# Patient Record
Sex: MALE | Race: WHITE | NOT HISPANIC OR LATINO
[De-identification: names, ages, dates, MRNs, and addresses within clinical notes are randomized per-mention and may not be internally consistent; named-entity substitution may affect disease eponyms.]

---

## 2017-01-03 ENCOUNTER — RESULT REVIEW (OUTPATIENT)
Age: 77
End: 2017-01-03

## 2017-01-04 ENCOUNTER — LABORATORY RESULT (OUTPATIENT)
Age: 77
End: 2017-01-04

## 2017-01-04 ENCOUNTER — CHART COPY (OUTPATIENT)
Age: 77
End: 2017-01-04

## 2017-01-04 ENCOUNTER — APPOINTMENT (OUTPATIENT)
Dept: INFUSION THERAPY | Facility: HOSPITAL | Age: 77
End: 2017-01-04

## 2017-01-05 ENCOUNTER — APPOINTMENT (OUTPATIENT)
Dept: INFUSION THERAPY | Facility: HOSPITAL | Age: 77
End: 2017-01-05

## 2017-01-05 ENCOUNTER — FORM ENCOUNTER (OUTPATIENT)
Age: 77
End: 2017-01-05

## 2017-01-06 ENCOUNTER — APPOINTMENT (OUTPATIENT)
Dept: INFUSION THERAPY | Facility: HOSPITAL | Age: 77
End: 2017-01-06

## 2017-01-06 ENCOUNTER — LABORATORY RESULT (OUTPATIENT)
Age: 77
End: 2017-01-06

## 2017-01-06 ENCOUNTER — OUTPATIENT (OUTPATIENT)
Dept: OUTPATIENT SERVICES | Facility: HOSPITAL | Age: 77
LOS: 1 days | End: 2017-01-06
Payer: MEDICARE

## 2017-01-06 ENCOUNTER — APPOINTMENT (OUTPATIENT)
Dept: ULTRASOUND IMAGING | Facility: IMAGING CENTER | Age: 77
End: 2017-01-06

## 2017-01-06 DIAGNOSIS — Z98.89 OTHER SPECIFIED POSTPROCEDURAL STATES: Chronic | ICD-10-CM

## 2017-01-06 DIAGNOSIS — C83.30 DIFFUSE LARGE B-CELL LYMPHOMA, UNSPECIFIED SITE: ICD-10-CM

## 2017-01-06 DIAGNOSIS — Z98.1 ARTHRODESIS STATUS: Chronic | ICD-10-CM

## 2017-01-06 PROCEDURE — 93970 EXTREMITY STUDY: CPT

## 2017-01-07 ENCOUNTER — APPOINTMENT (OUTPATIENT)
Dept: INFUSION THERAPY | Facility: HOSPITAL | Age: 77
End: 2017-01-07

## 2017-01-09 ENCOUNTER — FORM ENCOUNTER (OUTPATIENT)
Age: 77
End: 2017-01-09

## 2017-01-09 ENCOUNTER — RESULT REVIEW (OUTPATIENT)
Age: 77
End: 2017-01-09

## 2017-01-09 ENCOUNTER — OUTPATIENT (OUTPATIENT)
Dept: OUTPATIENT SERVICES | Facility: HOSPITAL | Age: 77
LOS: 1 days | Discharge: HOME | End: 2017-01-09
Payer: MEDICARE

## 2017-01-09 DIAGNOSIS — Z98.89 OTHER SPECIFIED POSTPROCEDURAL STATES: Chronic | ICD-10-CM

## 2017-01-09 DIAGNOSIS — C85.88 OTHER SPECIFIED TYPES OF NON-HODGKIN LYMPHOMA, LYMPH NODES OF MULTIPLE SITES: ICD-10-CM

## 2017-01-09 DIAGNOSIS — Z98.1 ARTHRODESIS STATUS: Chronic | ICD-10-CM

## 2017-01-10 ENCOUNTER — APPOINTMENT (OUTPATIENT)
Dept: HEMATOLOGY ONCOLOGY | Facility: CLINIC | Age: 77
End: 2017-01-10

## 2017-01-10 ENCOUNTER — APPOINTMENT (OUTPATIENT)
Dept: CT IMAGING | Facility: IMAGING CENTER | Age: 77
End: 2017-01-10

## 2017-01-10 ENCOUNTER — APPOINTMENT (OUTPATIENT)
Dept: CARDIOLOGY | Facility: CLINIC | Age: 77
End: 2017-01-10

## 2017-01-10 ENCOUNTER — OUTPATIENT (OUTPATIENT)
Dept: OUTPATIENT SERVICES | Facility: HOSPITAL | Age: 77
LOS: 1 days | End: 2017-01-10
Payer: MEDICARE

## 2017-01-10 ENCOUNTER — NON-APPOINTMENT (OUTPATIENT)
Age: 77
End: 2017-01-10

## 2017-01-10 VITALS
TEMPERATURE: 99.2 F | RESPIRATION RATE: 17 BRPM | SYSTOLIC BLOOD PRESSURE: 130 MMHG | WEIGHT: 203.46 LBS | HEART RATE: 124 BPM | OXYGEN SATURATION: 99 % | DIASTOLIC BLOOD PRESSURE: 74 MMHG | BODY MASS INDEX: 26.39 KG/M2

## 2017-01-10 VITALS
BODY MASS INDEX: 26.33 KG/M2 | OXYGEN SATURATION: 94 % | SYSTOLIC BLOOD PRESSURE: 98 MMHG | HEIGHT: 73.62 IN | DIASTOLIC BLOOD PRESSURE: 62 MMHG | HEART RATE: 111 BPM | WEIGHT: 203 LBS

## 2017-01-10 VITALS — DIASTOLIC BLOOD PRESSURE: 65 MMHG | SYSTOLIC BLOOD PRESSURE: 105 MMHG

## 2017-01-10 DIAGNOSIS — Z98.89 OTHER SPECIFIED POSTPROCEDURAL STATES: Chronic | ICD-10-CM

## 2017-01-10 DIAGNOSIS — C83.30 DIFFUSE LARGE B-CELL LYMPHOMA, UNSPECIFIED SITE: ICD-10-CM

## 2017-01-10 DIAGNOSIS — R07.89 OTHER CHEST PAIN: ICD-10-CM

## 2017-01-10 DIAGNOSIS — Z98.1 ARTHRODESIS STATUS: Chronic | ICD-10-CM

## 2017-01-10 DIAGNOSIS — H35.30 UNSPECIFIED MACULAR DEGENERATION: ICD-10-CM

## 2017-01-10 LAB
BASOPHILS # BLD AUTO: 0.1 K/UL — SIGNIFICANT CHANGE UP (ref 0–0.2)
BASOPHILS NFR BLD AUTO: 0.3 % — SIGNIFICANT CHANGE UP (ref 0–2)
EOSINOPHIL # BLD AUTO: 0.2 K/UL — SIGNIFICANT CHANGE UP (ref 0–0.5)
EOSINOPHIL NFR BLD AUTO: 1.5 % — SIGNIFICANT CHANGE UP (ref 0–6)
HCT VFR BLD CALC: 35 % — LOW (ref 39–50)
HGB BLD-MCNC: 11.5 G/DL — LOW (ref 13–17)
LYMPHOCYTES # BLD AUTO: 0.7 K/UL — LOW (ref 1–3.3)
LYMPHOCYTES # BLD AUTO: 4.3 % — LOW (ref 13–44)
MCHC RBC-ENTMCNC: 30.2 PG — SIGNIFICANT CHANGE UP (ref 27–34)
MCHC RBC-ENTMCNC: 32.8 GM/DL — SIGNIFICANT CHANGE UP (ref 32–36)
MCV RBC AUTO: 92.3 FL — SIGNIFICANT CHANGE UP (ref 80–100)
MONOCYTES # BLD AUTO: 0.1 K/UL — SIGNIFICANT CHANGE UP (ref 0–0.9)
MONOCYTES NFR BLD AUTO: 0.7 % — LOW (ref 2–14)
NEUTROPHILS # BLD AUTO: 15 K/UL — HIGH (ref 1.8–7.4)
NEUTROPHILS NFR BLD AUTO: 93.2 % — HIGH (ref 43–77)
PLATELET # BLD AUTO: 147 K/UL — LOW (ref 150–400)
RBC # BLD: 3.8 M/UL — LOW (ref 4.2–5.8)
RBC # FLD: 12.6 % — SIGNIFICANT CHANGE UP (ref 10.3–14.5)
WBC # BLD: 16.1 K/UL — HIGH (ref 3.8–10.5)
WBC # FLD AUTO: 16.1 K/UL — HIGH (ref 3.8–10.5)

## 2017-01-10 PROCEDURE — 70450 CT HEAD/BRAIN W/O DYE: CPT

## 2017-01-10 PROCEDURE — 93010 ELECTROCARDIOGRAM REPORT: CPT

## 2017-01-24 ENCOUNTER — RESULT REVIEW (OUTPATIENT)
Age: 77
End: 2017-01-24

## 2017-01-25 ENCOUNTER — APPOINTMENT (OUTPATIENT)
Dept: INFUSION THERAPY | Facility: HOSPITAL | Age: 77
End: 2017-01-25

## 2017-01-25 ENCOUNTER — LABORATORY RESULT (OUTPATIENT)
Age: 77
End: 2017-01-25

## 2017-01-25 LAB
BASOPHILS # BLD AUTO: 0 K/UL — SIGNIFICANT CHANGE UP (ref 0–0.2)
BASOPHILS NFR BLD AUTO: 0.7 % — SIGNIFICANT CHANGE UP (ref 0–2)
EOSINOPHIL # BLD AUTO: 0.2 K/UL — SIGNIFICANT CHANGE UP (ref 0–0.5)
EOSINOPHIL NFR BLD AUTO: 4 % — SIGNIFICANT CHANGE UP (ref 0–6)
HCT VFR BLD CALC: 36.3 % — LOW (ref 39–50)
HGB BLD-MCNC: 12 G/DL — LOW (ref 13–17)
LYMPHOCYTES # BLD AUTO: 0.9 K/UL — LOW (ref 1–3.3)
LYMPHOCYTES # BLD AUTO: 17.8 % — SIGNIFICANT CHANGE UP (ref 13–44)
MCHC RBC-ENTMCNC: 30.4 PG — SIGNIFICANT CHANGE UP (ref 27–34)
MCHC RBC-ENTMCNC: 33.1 GM/DL — SIGNIFICANT CHANGE UP (ref 32–36)
MCV RBC AUTO: 91.9 FL — SIGNIFICANT CHANGE UP (ref 80–100)
MONOCYTES # BLD AUTO: 0.9 K/UL — SIGNIFICANT CHANGE UP (ref 0–0.9)
MONOCYTES NFR BLD AUTO: 16.8 % — HIGH (ref 2–14)
NEUTROPHILS # BLD AUTO: 3.1 K/UL — SIGNIFICANT CHANGE UP (ref 1.8–7.4)
NEUTROPHILS NFR BLD AUTO: 60.6 % — SIGNIFICANT CHANGE UP (ref 43–77)
PLATELET # BLD AUTO: 238 K/UL — SIGNIFICANT CHANGE UP (ref 150–400)
RBC # BLD: 3.95 M/UL — LOW (ref 4.2–5.8)
RBC # FLD: 13.4 % — SIGNIFICANT CHANGE UP (ref 10.3–14.5)
WBC # BLD: 5.1 K/UL — SIGNIFICANT CHANGE UP (ref 3.8–10.5)
WBC # FLD AUTO: 5.1 K/UL — SIGNIFICANT CHANGE UP (ref 3.8–10.5)

## 2017-01-26 ENCOUNTER — APPOINTMENT (OUTPATIENT)
Dept: INFUSION THERAPY | Facility: HOSPITAL | Age: 77
End: 2017-01-26

## 2017-01-26 DIAGNOSIS — R11.2 NAUSEA WITH VOMITING, UNSPECIFIED: ICD-10-CM

## 2017-01-26 DIAGNOSIS — Z51.11 ENCOUNTER FOR ANTINEOPLASTIC CHEMOTHERAPY: ICD-10-CM

## 2017-01-27 ENCOUNTER — LABORATORY RESULT (OUTPATIENT)
Age: 77
End: 2017-01-27

## 2017-01-27 ENCOUNTER — APPOINTMENT (OUTPATIENT)
Dept: INFUSION THERAPY | Facility: HOSPITAL | Age: 77
End: 2017-01-27

## 2017-01-27 DIAGNOSIS — E86.0 DEHYDRATION: ICD-10-CM

## 2017-01-28 ENCOUNTER — APPOINTMENT (OUTPATIENT)
Dept: INFUSION THERAPY | Facility: HOSPITAL | Age: 77
End: 2017-01-28

## 2017-01-30 ENCOUNTER — RESULT REVIEW (OUTPATIENT)
Age: 77
End: 2017-01-30

## 2017-01-30 DIAGNOSIS — Z51.89 ENCOUNTER FOR OTHER SPECIFIED AFTERCARE: ICD-10-CM

## 2017-01-31 ENCOUNTER — APPOINTMENT (OUTPATIENT)
Dept: HEMATOLOGY ONCOLOGY | Facility: CLINIC | Age: 77
End: 2017-01-31

## 2017-01-31 VITALS
OXYGEN SATURATION: 94 % | TEMPERATURE: 97.8 F | DIASTOLIC BLOOD PRESSURE: 63 MMHG | SYSTOLIC BLOOD PRESSURE: 122 MMHG | HEART RATE: 101 BPM | BODY MASS INDEX: 25.74 KG/M2 | WEIGHT: 198.39 LBS | RESPIRATION RATE: 16 BRPM

## 2017-01-31 LAB
BASOPHILS # BLD AUTO: 0 K/UL — SIGNIFICANT CHANGE UP (ref 0–0.2)
EOSINOPHIL # BLD AUTO: 0.2 K/UL — SIGNIFICANT CHANGE UP (ref 0–0.5)
EOSINOPHIL NFR BLD AUTO: 2 % — SIGNIFICANT CHANGE UP (ref 0–6)
HCT VFR BLD CALC: 33.8 % — LOW (ref 39–50)
HGB BLD-MCNC: 10.7 G/DL — LOW (ref 13–17)
LYMPHOCYTES # BLD AUTO: 0.3 K/UL — LOW (ref 1–3.3)
LYMPHOCYTES # BLD AUTO: 2 % — LOW (ref 13–44)
MCHC RBC-ENTMCNC: 29.9 PG — SIGNIFICANT CHANGE UP (ref 27–34)
MCHC RBC-ENTMCNC: 31.7 GM/DL — LOW (ref 32–36)
MCV RBC AUTO: 94.2 FL — SIGNIFICANT CHANGE UP (ref 80–100)
METAMYELOCYTES # FLD: 9 % — HIGH (ref 0–0)
MONOCYTES # BLD AUTO: 0.2 K/UL — SIGNIFICANT CHANGE UP (ref 0–0.9)
MONOCYTES NFR BLD AUTO: 2 % — SIGNIFICANT CHANGE UP (ref 2–14)
MYELOCYTES NFR BLD: 1 % — HIGH (ref 0–0)
NEUTROPHILS # BLD AUTO: 38.1 K/UL — HIGH (ref 1.8–7.4)
NEUTROPHILS NFR BLD AUTO: 79 % — HIGH (ref 43–77)
NEUTS BAND # BLD: 5 % — SIGNIFICANT CHANGE UP (ref 0–8)
PLAT MORPH BLD: NORMAL — SIGNIFICANT CHANGE UP
PLATELET # BLD AUTO: 182 K/UL — SIGNIFICANT CHANGE UP (ref 150–400)
RBC # BLD: 3.59 M/UL — LOW (ref 4.2–5.8)
RBC # FLD: 13.5 % — SIGNIFICANT CHANGE UP (ref 10.3–14.5)
RBC BLD AUTO: SIGNIFICANT CHANGE UP
WBC # BLD: 38.8 K/UL — HIGH (ref 3.8–10.5)
WBC # FLD AUTO: 38.8 K/UL — HIGH (ref 3.8–10.5)

## 2017-01-31 RX ORDER — AMOXICILLIN AND CLAVULANATE POTASSIUM 875; 125 MG/1; MG/1
875-125 TABLET, COATED ORAL
Qty: 20 | Refills: 0 | Status: DISCONTINUED | COMMUNITY
Start: 2017-01-09 | End: 2017-01-31

## 2017-01-31 RX ORDER — LEVOFLOXACIN 500 MG/1
500 TABLET, FILM COATED ORAL
Qty: 7 | Refills: 4 | Status: DISCONTINUED | COMMUNITY
Start: 2017-01-04 | End: 2017-01-31

## 2017-01-31 RX ORDER — ALLOPURINOL 300 MG/1
300 TABLET ORAL
Qty: 14 | Refills: 0 | Status: DISCONTINUED | COMMUNITY
Start: 2017-01-03 | End: 2017-01-31

## 2017-02-14 ENCOUNTER — RESULT REVIEW (OUTPATIENT)
Age: 77
End: 2017-02-14

## 2017-02-14 ENCOUNTER — OUTPATIENT (OUTPATIENT)
Dept: OUTPATIENT SERVICES | Facility: HOSPITAL | Age: 77
LOS: 1 days | Discharge: HOME | End: 2017-02-14
Payer: MEDICARE

## 2017-02-14 DIAGNOSIS — Z98.1 ARTHRODESIS STATUS: Chronic | ICD-10-CM

## 2017-02-14 DIAGNOSIS — Z98.89 OTHER SPECIFIED POSTPROCEDURAL STATES: Chronic | ICD-10-CM

## 2017-02-14 DIAGNOSIS — C85.88 OTHER SPECIFIED TYPES OF NON-HODGKIN LYMPHOMA, LYMPH NODES OF MULTIPLE SITES: ICD-10-CM

## 2017-02-15 ENCOUNTER — LABORATORY RESULT (OUTPATIENT)
Age: 77
End: 2017-02-15

## 2017-02-15 ENCOUNTER — APPOINTMENT (OUTPATIENT)
Dept: INFUSION THERAPY | Facility: HOSPITAL | Age: 77
End: 2017-02-15

## 2017-02-15 LAB
ANISOCYTOSIS BLD QL: SLIGHT — SIGNIFICANT CHANGE UP
BASOPHILS # BLD AUTO: 0.1 K/UL — SIGNIFICANT CHANGE UP (ref 0–0.2)
BASOPHILS NFR BLD AUTO: 4 % — HIGH (ref 0–2)
ELLIPTOCYTES BLD QL SMEAR: SLIGHT — SIGNIFICANT CHANGE UP
EOSINOPHIL # BLD AUTO: 0 K/UL — SIGNIFICANT CHANGE UP (ref 0–0.5)
EOSINOPHIL NFR BLD AUTO: 5 % — SIGNIFICANT CHANGE UP (ref 0–6)
HCT VFR BLD CALC: 36.4 % — LOW (ref 39–50)
HGB BLD-MCNC: 11.8 G/DL — LOW (ref 13–17)
LYMPHOCYTES # BLD AUTO: 0.9 K/UL — LOW (ref 1–3.3)
LYMPHOCYTES # BLD AUTO: 12 % — LOW (ref 13–44)
MACROCYTES BLD QL: SLIGHT — SIGNIFICANT CHANGE UP
MCHC RBC-ENTMCNC: 30.1 PG — SIGNIFICANT CHANGE UP (ref 27–34)
MCHC RBC-ENTMCNC: 32.4 G/DL — SIGNIFICANT CHANGE UP (ref 32–36)
MCV RBC AUTO: 93 FL — SIGNIFICANT CHANGE UP (ref 80–100)
MONOCYTES # BLD AUTO: 0.6 K/UL — SIGNIFICANT CHANGE UP (ref 0–0.9)
MONOCYTES NFR BLD AUTO: 8 % — SIGNIFICANT CHANGE UP (ref 2–14)
MYELOCYTES NFR BLD: 1 % — HIGH (ref 0–0)
NEUTROPHILS # BLD AUTO: 5.4 K/UL — SIGNIFICANT CHANGE UP (ref 1.8–7.4)
NEUTROPHILS NFR BLD AUTO: 70 % — SIGNIFICANT CHANGE UP (ref 43–77)
OVALOCYTES BLD QL SMEAR: SLIGHT — SIGNIFICANT CHANGE UP
PLAT MORPH BLD: NORMAL — SIGNIFICANT CHANGE UP
PLATELET # BLD AUTO: 207 K/UL — SIGNIFICANT CHANGE UP (ref 150–400)
POIKILOCYTOSIS BLD QL AUTO: SLIGHT — SIGNIFICANT CHANGE UP
POLYCHROMASIA BLD QL SMEAR: SLIGHT — SIGNIFICANT CHANGE UP
RBC # BLD: 3.91 M/UL — LOW (ref 4.2–5.8)
RBC # FLD: 14 % — SIGNIFICANT CHANGE UP (ref 10.3–14.5)
RBC BLD AUTO: SIGNIFICANT CHANGE UP
WBC # BLD: 7 K/UL — SIGNIFICANT CHANGE UP (ref 3.8–10.5)
WBC # FLD AUTO: 7 K/UL — SIGNIFICANT CHANGE UP (ref 3.8–10.5)

## 2017-02-16 ENCOUNTER — APPOINTMENT (OUTPATIENT)
Dept: INFUSION THERAPY | Facility: HOSPITAL | Age: 77
End: 2017-02-16

## 2017-02-16 DIAGNOSIS — Z51.11 ENCOUNTER FOR ANTINEOPLASTIC CHEMOTHERAPY: ICD-10-CM

## 2017-02-16 DIAGNOSIS — R11.2 NAUSEA WITH VOMITING, UNSPECIFIED: ICD-10-CM

## 2017-02-17 ENCOUNTER — APPOINTMENT (OUTPATIENT)
Dept: INFUSION THERAPY | Facility: HOSPITAL | Age: 77
End: 2017-02-17

## 2017-02-18 ENCOUNTER — APPOINTMENT (OUTPATIENT)
Dept: INFUSION THERAPY | Facility: HOSPITAL | Age: 77
End: 2017-02-18

## 2017-02-20 ENCOUNTER — RESULT REVIEW (OUTPATIENT)
Age: 77
End: 2017-02-20

## 2017-02-21 ENCOUNTER — INPATIENT (INPATIENT)
Facility: HOSPITAL | Age: 77
LOS: 1 days | Discharge: HOME | DRG: 308 | End: 2017-02-23
Attending: HOSPITALIST | Admitting: HOSPITALIST
Payer: MEDICARE

## 2017-02-21 ENCOUNTER — APPOINTMENT (OUTPATIENT)
Dept: HEMATOLOGY ONCOLOGY | Facility: CLINIC | Age: 77
End: 2017-02-21

## 2017-02-21 VITALS
TEMPERATURE: 98.8 F | HEART RATE: 88 BPM | RESPIRATION RATE: 17 BRPM | WEIGHT: 196.21 LBS | OXYGEN SATURATION: 99 % | SYSTOLIC BLOOD PRESSURE: 94 MMHG | BODY MASS INDEX: 25.45 KG/M2 | DIASTOLIC BLOOD PRESSURE: 62 MMHG

## 2017-02-21 VITALS — DIASTOLIC BLOOD PRESSURE: 70 MMHG | HEART RATE: 152 BPM | SYSTOLIC BLOOD PRESSURE: 110 MMHG

## 2017-02-21 DIAGNOSIS — J44.9 CHRONIC OBSTRUCTIVE PULMONARY DISEASE, UNSPECIFIED: ICD-10-CM

## 2017-02-21 DIAGNOSIS — Z53.09 PROCEDURE AND TREATMENT NOT CARRIED OUT BECAUSE OF OTHER CONTRAINDICATION: ICD-10-CM

## 2017-02-21 DIAGNOSIS — C83.30 DIFFUSE LARGE B-CELL LYMPHOMA, UNSPECIFIED SITE: ICD-10-CM

## 2017-02-21 DIAGNOSIS — I73.9 PERIPHERAL VASCULAR DISEASE, UNSPECIFIED: ICD-10-CM

## 2017-02-21 DIAGNOSIS — Z98.89 OTHER SPECIFIED POSTPROCEDURAL STATES: Chronic | ICD-10-CM

## 2017-02-21 DIAGNOSIS — J18.9 PNEUMONIA, UNSPECIFIED ORGANISM: ICD-10-CM

## 2017-02-21 DIAGNOSIS — Z51.89 ENCOUNTER FOR OTHER SPECIFIED AFTERCARE: ICD-10-CM

## 2017-02-21 DIAGNOSIS — Z98.1 ARTHRODESIS STATUS: Chronic | ICD-10-CM

## 2017-02-21 DIAGNOSIS — M86.672 OTHER CHRONIC OSTEOMYELITIS, LEFT ANKLE AND FOOT: ICD-10-CM

## 2017-02-21 DIAGNOSIS — I48.91 UNSPECIFIED ATRIAL FIBRILLATION: ICD-10-CM

## 2017-02-21 LAB
ALBUMIN SERPL ELPH-MCNC: 3.2 G/DL — LOW (ref 3.3–5)
ALP SERPL-CCNC: 112 U/L — SIGNIFICANT CHANGE UP (ref 40–120)
ALT FLD-CCNC: 11 U/L RC — SIGNIFICANT CHANGE UP (ref 10–45)
ANION GAP SERPL CALC-SCNC: 11 MMOL/L — SIGNIFICANT CHANGE UP (ref 5–17)
APPEARANCE UR: CLEAR — SIGNIFICANT CHANGE UP
APTT BLD: 26.3 SEC — LOW (ref 27.5–37.4)
AST SERPL-CCNC: 15 U/L — SIGNIFICANT CHANGE UP (ref 10–40)
BACTERIA # UR AUTO: ABNORMAL /HPF
BASOPHILS # BLD AUTO: 0 K/UL — SIGNIFICANT CHANGE UP (ref 0–0.2)
BASOPHILS # BLD AUTO: 0 K/UL — SIGNIFICANT CHANGE UP (ref 0–0.2)
BILIRUB SERPL-MCNC: 0.7 MG/DL — SIGNIFICANT CHANGE UP (ref 0.2–1.2)
BILIRUB UR-MCNC: NEGATIVE — SIGNIFICANT CHANGE UP
BUN SERPL-MCNC: 18 MG/DL — SIGNIFICANT CHANGE UP (ref 7–23)
CALCIUM SERPL-MCNC: 8.2 MG/DL — LOW (ref 8.4–10.5)
CHLORIDE SERPL-SCNC: 102 MMOL/L — SIGNIFICANT CHANGE UP (ref 96–108)
CK MB CFR SERPL CALC: 1.5 NG/ML — SIGNIFICANT CHANGE UP (ref 0–6.7)
CK SERPL-CCNC: 19 U/L — LOW (ref 30–200)
CO2 SERPL-SCNC: 28 MMOL/L — SIGNIFICANT CHANGE UP (ref 22–31)
COLOR SPEC: YELLOW — SIGNIFICANT CHANGE UP
CREAT SERPL-MCNC: 0.45 MG/DL — LOW (ref 0.5–1.3)
DIFF PNL FLD: NEGATIVE — SIGNIFICANT CHANGE UP
EOSINOPHIL # BLD AUTO: 0.2 K/UL — SIGNIFICANT CHANGE UP (ref 0–0.5)
EOSINOPHIL # BLD AUTO: 0.3 K/UL — SIGNIFICANT CHANGE UP (ref 0–0.5)
EOSINOPHIL NFR BLD AUTO: 1 % — SIGNIFICANT CHANGE UP (ref 0–6)
GLUCOSE SERPL-MCNC: 104 MG/DL — HIGH (ref 70–99)
GLUCOSE UR QL: NEGATIVE — SIGNIFICANT CHANGE UP
HCT VFR BLD CALC: 31.2 % — LOW (ref 39–50)
HCT VFR BLD CALC: 31.4 % — LOW (ref 39–50)
HGB BLD-MCNC: 10.1 G/DL — LOW (ref 13–17)
HGB BLD-MCNC: 10.3 G/DL — LOW (ref 13–17)
INR BLD: 1.01 RATIO — SIGNIFICANT CHANGE UP (ref 0.88–1.16)
KETONES UR-MCNC: NEGATIVE — SIGNIFICANT CHANGE UP
LEUKOCYTE ESTERASE UR-ACNC: NEGATIVE — SIGNIFICANT CHANGE UP
LYMPHOCYTES # BLD AUTO: 0.5 K/UL — LOW (ref 1–3.3)
LYMPHOCYTES # BLD AUTO: 0.6 K/UL — LOW (ref 1–3.3)
LYMPHOCYTES # BLD AUTO: 1 % — LOW (ref 13–44)
LYMPHOCYTES # BLD AUTO: 2 % — LOW (ref 13–44)
MCHC RBC-ENTMCNC: 30.4 PG — SIGNIFICANT CHANGE UP (ref 27–34)
MCHC RBC-ENTMCNC: 30.9 PG — SIGNIFICANT CHANGE UP (ref 27–34)
MCHC RBC-ENTMCNC: 32.4 GM/DL — SIGNIFICANT CHANGE UP (ref 32–36)
MCHC RBC-ENTMCNC: 32.9 G/DL — SIGNIFICANT CHANGE UP (ref 32–36)
MCV RBC AUTO: 93.7 FL — SIGNIFICANT CHANGE UP (ref 80–100)
MCV RBC AUTO: 94 FL — SIGNIFICANT CHANGE UP (ref 80–100)
MONOCYTES # BLD AUTO: 0.2 K/UL — SIGNIFICANT CHANGE UP (ref 0–0.9)
MONOCYTES # BLD AUTO: 0.2 K/UL — SIGNIFICANT CHANGE UP (ref 0–0.9)
MONOCYTES NFR BLD AUTO: 1 % — LOW (ref 2–14)
MONOCYTES NFR BLD AUTO: 2 % — SIGNIFICANT CHANGE UP (ref 2–14)
NEUTROPHILS # BLD AUTO: 22.7 K/UL — HIGH (ref 1.8–7.4)
NEUTROPHILS # BLD AUTO: 25.3 K/UL — HIGH (ref 1.8–7.4)
NEUTROPHILS NFR BLD AUTO: 87 % — HIGH (ref 43–77)
NEUTROPHILS NFR BLD AUTO: 98 % — HIGH (ref 43–77)
NITRITE UR-MCNC: NEGATIVE — SIGNIFICANT CHANGE UP
NT-PROBNP SERPL-SCNC: 378 PG/ML — HIGH (ref 0–300)
PH UR: 6 — SIGNIFICANT CHANGE UP (ref 4.8–8)
PLAT MORPH BLD: NORMAL — SIGNIFICANT CHANGE UP
PLATELET # BLD AUTO: 103 K/UL — LOW (ref 150–400)
PLATELET # BLD AUTO: 118 K/UL — LOW (ref 150–400)
POTASSIUM SERPL-MCNC: 4.1 MMOL/L — SIGNIFICANT CHANGE UP (ref 3.5–5.3)
POTASSIUM SERPL-SCNC: 4.1 MMOL/L — SIGNIFICANT CHANGE UP (ref 3.5–5.3)
PROT SERPL-MCNC: 5.4 G/DL — LOW (ref 6–8.3)
PROT UR-MCNC: NEGATIVE — SIGNIFICANT CHANGE UP
PROTHROM AB SERPL-ACNC: 11 SEC — SIGNIFICANT CHANGE UP (ref 10–13.1)
RAPID RVP RESULT: SIGNIFICANT CHANGE UP
RBC # BLD: 3.33 M/UL — LOW (ref 4.2–5.8)
RBC # BLD: 3.34 M/UL — LOW (ref 4.2–5.8)
RBC # FLD: 14.3 % — SIGNIFICANT CHANGE UP (ref 10.3–14.5)
RBC # FLD: 14.4 % — SIGNIFICANT CHANGE UP (ref 10.3–14.5)
RBC BLD AUTO: SIGNIFICANT CHANGE UP
RBC CASTS # UR COMP ASSIST: SIGNIFICANT CHANGE UP /HPF (ref 0–2)
SODIUM SERPL-SCNC: 141 MMOL/L — SIGNIFICANT CHANGE UP (ref 135–145)
SP GR SPEC: 1.01 — LOW (ref 1.01–1.02)
TROPONIN T SERPL-MCNC: <0.01 NG/ML — SIGNIFICANT CHANGE UP (ref 0–0.06)
TSH SERPL-MCNC: 4.31 UIU/ML — HIGH (ref 0.27–4.2)
UROBILINOGEN FLD QL: NEGATIVE — SIGNIFICANT CHANGE UP
WBC # BLD: 23.6 K/UL — HIGH (ref 3.8–10.5)
WBC # BLD: 26.4 K/UL — HIGH (ref 3.8–10.5)
WBC # FLD AUTO: 23.6 K/UL — HIGH (ref 3.8–10.5)
WBC # FLD AUTO: 26.4 K/UL — HIGH (ref 3.8–10.5)
WBC UR QL: SIGNIFICANT CHANGE UP /HPF (ref 0–5)

## 2017-02-21 PROCEDURE — 71275 CT ANGIOGRAPHY CHEST: CPT | Mod: 26

## 2017-02-21 PROCEDURE — 93010 ELECTROCARDIOGRAM REPORT: CPT

## 2017-02-21 PROCEDURE — 99285 EMERGENCY DEPT VISIT HI MDM: CPT | Mod: 25,GC

## 2017-02-21 PROCEDURE — 70450 CT HEAD/BRAIN W/O DYE: CPT | Mod: 26

## 2017-02-21 PROCEDURE — 93308 TTE F-UP OR LMTD: CPT | Mod: 26

## 2017-02-21 PROCEDURE — 71010: CPT | Mod: 26

## 2017-02-21 PROCEDURE — 93010 ELECTROCARDIOGRAM REPORT: CPT | Mod: GC

## 2017-02-21 PROCEDURE — 99223 1ST HOSP IP/OBS HIGH 75: CPT

## 2017-02-21 RX ORDER — IPRATROPIUM/ALBUTEROL SULFATE 18-103MCG
3 AEROSOL WITH ADAPTER (GRAM) INHALATION EVERY 6 HOURS
Qty: 0 | Refills: 0 | Status: DISCONTINUED | OUTPATIENT
Start: 2017-02-21 | End: 2017-02-22

## 2017-02-21 RX ORDER — SODIUM CHLORIDE 9 MG/ML
1000 INJECTION INTRAMUSCULAR; INTRAVENOUS; SUBCUTANEOUS ONCE
Qty: 0 | Refills: 0 | Status: COMPLETED | OUTPATIENT
Start: 2017-02-21 | End: 2017-02-21

## 2017-02-21 RX ORDER — HEPARIN SODIUM 5000 [USP'U]/ML
7500 INJECTION INTRAVENOUS; SUBCUTANEOUS EVERY 6 HOURS
Qty: 0 | Refills: 0 | Status: DISCONTINUED | OUTPATIENT
Start: 2017-02-21 | End: 2017-02-21

## 2017-02-21 RX ORDER — PIPERACILLIN AND TAZOBACTAM 4; .5 G/20ML; G/20ML
3.38 INJECTION, POWDER, LYOPHILIZED, FOR SOLUTION INTRAVENOUS ONCE
Qty: 0 | Refills: 0 | Status: COMPLETED | OUTPATIENT
Start: 2017-02-21 | End: 2017-02-22

## 2017-02-21 RX ORDER — ACETAMINOPHEN 500 MG
650 TABLET ORAL EVERY 6 HOURS
Qty: 0 | Refills: 0 | Status: DISCONTINUED | OUTPATIENT
Start: 2017-02-21 | End: 2017-02-23

## 2017-02-21 RX ORDER — VANCOMYCIN HCL 1 G
1000 VIAL (EA) INTRAVENOUS ONCE
Qty: 0 | Refills: 0 | Status: COMPLETED | OUTPATIENT
Start: 2017-02-21 | End: 2017-02-22

## 2017-02-21 RX ORDER — HEPARIN SODIUM 5000 [USP'U]/ML
INJECTION INTRAVENOUS; SUBCUTANEOUS
Qty: 25000 | Refills: 0 | Status: DISCONTINUED | OUTPATIENT
Start: 2017-02-21 | End: 2017-02-21

## 2017-02-21 RX ORDER — HEPARIN SODIUM 5000 [USP'U]/ML
INJECTION INTRAVENOUS; SUBCUTANEOUS
Qty: 25000 | Refills: 0 | Status: DISCONTINUED | OUTPATIENT
Start: 2017-02-21 | End: 2017-02-22

## 2017-02-21 RX ORDER — MINOCYCLINE HYDROCHLORIDE 45 MG/1
100 TABLET, EXTENDED RELEASE ORAL
Qty: 0 | Refills: 0 | Status: DISCONTINUED | OUTPATIENT
Start: 2017-02-21 | End: 2017-02-21

## 2017-02-21 RX ORDER — VANCOMYCIN HCL 1 G
VIAL (EA) INTRAVENOUS
Qty: 0 | Refills: 0 | Status: DISCONTINUED | OUTPATIENT
Start: 2017-02-22 | End: 2017-02-22

## 2017-02-21 RX ORDER — HEPARIN SODIUM 5000 [USP'U]/ML
7500 INJECTION INTRAVENOUS; SUBCUTANEOUS ONCE
Qty: 0 | Refills: 0 | Status: DISCONTINUED | OUTPATIENT
Start: 2017-02-21 | End: 2017-02-21

## 2017-02-21 RX ORDER — HEPARIN SODIUM 5000 [USP'U]/ML
7500 INJECTION INTRAVENOUS; SUBCUTANEOUS EVERY 6 HOURS
Qty: 0 | Refills: 0 | Status: DISCONTINUED | OUTPATIENT
Start: 2017-02-21 | End: 2017-02-22

## 2017-02-21 RX ORDER — PIPERACILLIN AND TAZOBACTAM 4; .5 G/20ML; G/20ML
3.38 INJECTION, POWDER, LYOPHILIZED, FOR SOLUTION INTRAVENOUS EVERY 8 HOURS
Qty: 0 | Refills: 0 | Status: DISCONTINUED | OUTPATIENT
Start: 2017-02-21 | End: 2017-02-22

## 2017-02-21 RX ORDER — VANCOMYCIN HCL 1 G
1000 VIAL (EA) INTRAVENOUS EVERY 12 HOURS
Qty: 0 | Refills: 0 | Status: DISCONTINUED | OUTPATIENT
Start: 2017-02-22 | End: 2017-02-22

## 2017-02-21 RX ORDER — AZITHROMYCIN 500 MG/1
500 TABLET, FILM COATED ORAL ONCE
Qty: 0 | Refills: 0 | Status: DISCONTINUED | OUTPATIENT
Start: 2017-02-21 | End: 2017-02-21

## 2017-02-21 RX ORDER — HEPARIN SODIUM 5000 [USP'U]/ML
7500 INJECTION INTRAVENOUS; SUBCUTANEOUS ONCE
Qty: 0 | Refills: 0 | Status: COMPLETED | OUTPATIENT
Start: 2017-02-21 | End: 2017-02-21

## 2017-02-21 RX ORDER — HEPARIN SODIUM 5000 [USP'U]/ML
3500 INJECTION INTRAVENOUS; SUBCUTANEOUS EVERY 6 HOURS
Qty: 0 | Refills: 0 | Status: DISCONTINUED | OUTPATIENT
Start: 2017-02-21 | End: 2017-02-22

## 2017-02-21 RX ORDER — ASPIRIN/CALCIUM CARB/MAGNESIUM 324 MG
81 TABLET ORAL DAILY
Qty: 0 | Refills: 0 | Status: DISCONTINUED | OUTPATIENT
Start: 2017-02-21 | End: 2017-02-23

## 2017-02-21 RX ORDER — CEFTRIAXONE 500 MG/1
1 INJECTION, POWDER, FOR SOLUTION INTRAMUSCULAR; INTRAVENOUS ONCE
Qty: 0 | Refills: 0 | Status: DISCONTINUED | OUTPATIENT
Start: 2017-02-21 | End: 2017-02-21

## 2017-02-21 RX ORDER — HEPARIN SODIUM 5000 [USP'U]/ML
3500 INJECTION INTRAVENOUS; SUBCUTANEOUS EVERY 6 HOURS
Qty: 0 | Refills: 0 | Status: DISCONTINUED | OUTPATIENT
Start: 2017-02-21 | End: 2017-02-21

## 2017-02-21 RX ADMIN — SODIUM CHLORIDE 1000 MILLILITER(S): 9 INJECTION INTRAMUSCULAR; INTRAVENOUS; SUBCUTANEOUS at 15:00

## 2017-02-21 RX ADMIN — HEPARIN SODIUM 7500 UNIT(S): 5000 INJECTION INTRAVENOUS; SUBCUTANEOUS at 20:21

## 2017-02-21 RX ADMIN — HEPARIN SODIUM 1700 UNIT(S)/HR: 5000 INJECTION INTRAVENOUS; SUBCUTANEOUS at 20:19

## 2017-02-21 NOTE — H&P ADULT. - OTHER CARE PROVIDERS
Dr. Luisito Eason (PCP), Dr. Grabiel Swenson (Hematology) Dr. Luisito Eason (PCP), Dr. Grabiel Swenson (Hematology), Dr. Chon Wong (Cardiology)

## 2017-02-21 NOTE — ED PROVIDER NOTE - PROGRESS NOTE DETAILS
case d/w with dr. lindo of hematology, agrees with plan for admission.  recommends heparin which is easily reversible given possibility of worsening thrombocytopenia given recent chemo.

## 2017-02-21 NOTE — H&P ADULT. - PROBLEM SELECTOR PLAN 2
On chemo, last tx 2/15/17. Follows with Dr. Swenson.  - Continue to follow up with Hematology as an outpatient, labs currently stable  - Check tumor lysis labs in AM CTA chest with scattered areas of nodularity within the right upper and middle lobes.  - Given pt's h/o MRSA infection, will do Vanc and Zosyn instead of Azithro and Ceftriaxone.   - F/u blood cxs

## 2017-02-21 NOTE — H&P ADULT. - PROBLEM SELECTOR PROBLEM 6
Contraindication to venous thromboembolism (VTE) prophylaxis COPD (chronic obstructive pulmonary disease)

## 2017-02-21 NOTE — H&P ADULT. - NEUROLOGICAL DETAILS
responds to verbal commands/cranial nerves intact/alert and oriented x 3/normal strength/sensation intact

## 2017-02-21 NOTE — H&P ADULT. - ATTENDING COMMENTS
NIGHT HOSPITALIST:  Patient UNKNOWN to me previously, assigned to me at this point via the ER and by the ONCOLOGY service to admit this 77 y/o M--followed by Dr. Vieyra, Dr. Swenson, and Dr. 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  Patient with presenting palpitations at [facility] while preparing for chemo.  No chest pain/pressure.  No dyspnea.  No HA, no focal weakness.  NO abdominal pain, no red blood per rectum or melena.  No back pain no tearing back pain.  Remaining review of systems not contributory.  Patient recently  from his spouse of 50 years with spouse succumbing from a CVA.  He has a son who lives in [place] and a daughter in [place].  Physical exam with an elderly, chronically ill appearing M with diffuse sarcopenia.  Telemetry now NSR at 80.  Hemodynamics stable.  HEENT< PERRL, EOMI, neck supple, chest clear, cor s1 s2, abdomen soft, normal bowel sounds, nontender, nondistended.  scaphoid.  Ext with healed RIGHT medial thigh from prior lymphoma treatment.  LEFT>right with diffuse chronic purplish induration.   Pulses grossly intact.  Neurologic exam AxOx3.  Speech fluent.  Cognition intact.  UE/LE 5/5.  WBC 23.5 with 8% bands.  Hgb 10.1.  Platelets of 103K.  aPTT on heparin 80.4.  Troponin neg.  .  Alb 3.2.  Cr 0.4.  Chest radiograph with chronic pleural thickening.  CTT angiogram with NO PE but RIGHT upper and middle lobe with tree in bud opacifications.  CTT head negative.  Admitted to telemetry.  Risks/benefits reviewed with patient who agrees to continue with IV heparin as above.  Follow CBC, platelets.  Cardiology following.  For echo.  Check TSH.  Would continue with broad spectrum antibiotics given patient's immune suppression status on chemotherapy.  Prognosis is guarded.  Emotional support provided to patient.  Patient / son aware of course and agree with plan/care as above.  Care assumed by the DAY HOSPITALIST.

## 2017-02-21 NOTE — H&P ADULT. - LYMPHATIC
supraclavicular L/posterior cervical R/supraclavicular R/posterior cervical L/anterior cervical R/anterior cervical L

## 2017-02-21 NOTE — ED ADULT NURSE REASSESSMENT NOTE - NS ED NURSE REASSESS COMMENT FT1
pt vss. pt AAOx3, ambulatory with assistance. pt taken off 2L02 for 100% o2 on RA. pt pending bed assignment. heparin co-sgined with bronwyn SUTTON RN+.

## 2017-02-21 NOTE — H&P ADULT. - EXTREMITIES COMMENTS
Thin brittle shiny skin with purplish discoloration in the distal LEs b/l. L shin with slight swelling and redness, chronic.

## 2017-02-21 NOTE — H&P ADULT. - MUSCULOSKELETAL
details… detailed exam normal strength/no joint erythema/no calf tenderness/ROM intact/no joint warmth/no joint swelling

## 2017-02-21 NOTE — H&P ADULT. - LAB RESULTS AND INTERPRETATION
Labs personally reviewed. WBC 23.6, Hgb 10.1, plts 103 (per Hematology fellow, plts generally in the 100s, but never too low). Waleska x 1 negative. RVP negative. UA negative.

## 2017-02-21 NOTE — H&P ADULT. - RADIOLOGY RESULTS AND INTERPRETATION
Imaging personally reviewed. Bedside echo with no pericardial effusion or global wall motion abnormality. CT head with no evidence of acute intracranial hemorrhage, brain edema, or mass effect.

## 2017-02-21 NOTE — H&P ADULT. - PMH
Basal cell cancer    Chronic osteomyelitis of left foot    Colon cancer    COPD (chronic obstructive pulmonary disease)    Coronary artery disease    Non Hodgkin's lymphoma    Osteomyelitis of spine    Peripheral vascular disease    Prostate ca

## 2017-02-21 NOTE — H&P ADULT. - PROBLEM SELECTOR PLAN 3
H/o MRSA infection, also involving spine. On Minocycline for prophylaxis. No acute issues currently.   - C/w Minocycline  - Obtain wound care consult On chemo, last tx 2/15/17. Follows with Dr. Swenson.  - Obtain Hematology consult in AM  - Check tumor lysis labs in AM  - Continue to follow up with Hematology as an outpatient, labs currently stable

## 2017-02-21 NOTE — H&P ADULT. - PROBLEM SELECTOR PLAN 1
New onset. With RVR to the 150s. Unclear etiology. S/p IV and PO diltiazem in the ED, now with sinus rhythm. [id number] score 3, so moderate-high stroke risk.  - CT head negative for brain mets or ICH, will start heparin gtt for A/C  - Start BB blocker or CCB for rate control  - Obtain cardiology consult (saw Dr. Chon Wong) in AM  - Will need to have discussion regarding oral A/C options  - Obtain TTE  - Trend Waleska  - F/u TSH New onset. With RVR to the 150s. Unclear etiology. S/p IV and PO diltiazem in the ED, now with sinus rhythm. [id number] score 3, so moderate-high stroke risk.  - CT head negative for brain mets or ICH, will start heparin gtt for A/C  - Start PO diltiazem 30q6 for rate control  - Obtain cardiology consult (saw Dr. Chon Wong) in AM  - Will need to have discussion regarding oral A/C options  - Obtain TTE  - Trend Waleska  - F/u TSH New onset. With RVR to the 150s. Unclear etiology. S/p IV and PO diltiazem in the ED, now with sinus rhythm. [id number] score 3, so moderate-high stroke risk.  - CT head negative for brain mets or ICH, will start heparin gtt for A/C  - Start PO diltiazem 30mg q6hrs for rate control  - Obtain cardiology consult (saw Dr. Chon Wong) in AM  - Will need to have discussion regarding oral A/C options  - Obtain TTE  - Trend Waleska  - F/u TSH

## 2017-02-21 NOTE — ED PROVIDER NOTE - OBJECTIVE STATEMENT
76M hx active lymphoma currently being treated with chemo last (2/17), prostate CA, laminectomy presents from [facility] with palpitations and SOB worsening since last night.  At [facility] patient noted to have tachycardia to 150s associated with slight hypotnesion 100s/50s which improved with IVF as per EMS.  SO2 100% on RA.  mild Patient states symptoms improved en route to hospital.  Denies fever/chills, nausea/vomiting.  Patient reports remote hx of arrythmia, for which he took metoprolol for a short period of time with no other issues.     PMD - Hever Eason  Onc Peyton Swenson

## 2017-02-21 NOTE — H&P ADULT. - NEGATIVE NEUROLOGICAL SYMPTOMS
no confusion/no syncope/no loss of consciousness/no headache/no generalized seizures/no loss of sensation/no tremors

## 2017-02-21 NOTE — H&P ADULT. - HISTORY OF PRESENT ILLNESS
77 yo M with h/o high-grade diffuse large B cell lymphoma (on chemo, last tx 2/15/17), prostate cancer (diag in 2008, no tx currently), colon cancer s/p colectomy (1995, in remission), basal cell carcinoma s/p resection, CAD, PVD, chronic osteomyelitis of L heel, spinal abscess with MRSA s/p laminectomy, and cataracts presents from [facility] after pt was found to be in rapid afib to the 150s.     In the ED, VS: T 98.2  /70 RR 18 O2 Sat 98% on supp O2. Pt given IV diltiazem 10mg x 1, followed by 5mg x 1, then PO diltiazem 30mg x 1. Pt's HR improved to the 80s. Pt also got 1L bolus of NS. Labs: WBC 23.6, Hgb 10.1, plts 103 (per Hematology fellow, plts generally in the 100s, but never too low). Waleska x 1 negative. RVP negative. UA negative. Bedside echo with no pericardial effusion or global wall motion abnormality. CT head with no evidence of acute intracranial hemorrhage, brain edema, or mass effect. 77 yo M with h/o high-grade diffuse large B cell lymphoma (on chemo, last tx 2/15/17), prostate cancer (diag in 2008, no tx currently), colon cancer s/p colectomy (1995, in remission), basal cell carcinoma s/p resection, CAD, PVD, chronic osteomyelitis of L heel, spinal abscess with osteomyelitis with MRSA s/p laminectomy, and cataracts presents from [facility] after pt was found to be in new-onset rapid afib to the 150s. Pt states that he had onset of palpitations last night associated with mild SOB and more rapid breathing. The palpitations and SOB persisted today, but pt had no other symptoms. No CP, F/C, URI symptoms. Pt reports having crampy abdominal pain with 1 episode of loose nonbloody BM yesterday, though no additional episodes today. Denies any N/V, diarrhea, constipation, or dysuria. No recent sick contacts or changes in medications. Has good appetite and PO intake with ~4-5 lb weight loss since initiation of chemo ~12 weeks ago.    In the ED, VS: T 98.2  /70 RR 18 O2 Sat 98% on supp O2. Pt given IV diltiazem 10mg x 1, followed by 5mg x 1, then PO diltiazem 30mg x 1. Pt's HR improved to the 80s. Pt also got 1L bolus of NS. Labs: WBC 23.6, Hgb 10.1, plts 103 (per Hematology fellow, plts generally in the 100s, but never too low). Waleska x 1 negative. RVP negative. UA negative. Bedside echo with no pericardial effusion or global wall motion abnormality. CT head with no evidence of acute intracranial hemorrhage, brain edema, or mass effect. 75 yo M with h/o high-grade diffuse large B cell lymphoma (on chemo, last tx 2/15/17), prostate cancer (diag in 2008, no tx currently), colon cancer s/p colectomy (1995, in remission), basal cell carcinoma s/p resection, CAD, PVD, chronic osteomyelitis of L heel, spinal abscess with osteomyelitis with MRSA s/p laminectomy, ?COPD, former 40-pack-yr tobacco history, and cataracts presents from [facility] after pt was found to be in new-onset rapid afib to the 150s. Pt states that he had onset of palpitations last night associated with mild SOB and more rapid breathing. The palpitations and SOB persisted today, but pt had no other symptoms. No CP, F/C, URI symptoms. Pt reports having crampy abdominal pain with 1 episode of loose nonbloody BM yesterday, though no additional episodes today. Denies any N/V, diarrhea, constipation, or dysuria. No recent sick contacts or changes in medications. Has good appetite and PO intake with ~4-5 lb weight loss since initiation of chemo ~12 weeks ago.    In the ED, VS: T 98.2  /70 RR 18 O2 Sat 98% on supp O2. Pt given IV diltiazem 10mg x 1, followed by 5mg x 1, then PO diltiazem 30mg x 1. Pt's HR improved to the 80s. Pt also got 1L bolus of NS. Labs: WBC 23.6, Hgb 10.1, plts 103 (per Hematology fellow, plts generally in the 100s, but never too low). Waleska x 1 negative. RVP negative. UA negative. Bedside echo with no pericardial effusion or global wall motion abnormality. CT head with no evidence of acute intracranial hemorrhage, brain edema, or mass effect. CTA chest with scattered areas of tree-in-bud nodularity within the right upper and middle lobes. Pt broke out of afib and is in sinus rhythm currently.

## 2017-02-21 NOTE — ED ADULT NURSE NOTE - OBJECTIVE STATEMENT
76 year old male with hx.of lymphoma,prostate ca.last chemo.2/17/17 ,while in  [facility] today pt.developed A-FIB with RVR. 155.denies sob,chest pain ,fever or cough

## 2017-02-21 NOTE — ED PROVIDER NOTE - MEDICAL DECISION MAKING DETAILS
76M hx lymphoma actively treated with chemo presents with new-onset afib/aflutter.  at this time hemodynamically stable with IVF.  will rate controll with cardizem.  r/o PE withd-dimer given ca hx, will progress to CTA if positive.  no apparent infectious etiology at this time.  will admit for anticoagulation in consultation with hematology pending negative CTH. 76M hx lymphoma actively treated with chemo presents with new-onset afib/aflutter.  at this time hemodynamically stable with IVF.  will rate controll with cardizem.  r/o PE withd-dimer given ca hx, will progress to CTA if positive.  no apparent infectious etiology at this time.  will admit for anticoagulation in consultation with hematology pending negative CTH.  Attending Mustapha: 75 y/o male with multiple medical issues presenting with palpitations. upon arrival found to be in rapid afib. pt not on anticoagulation. no HA or neurologic deficits. afebrile. concern for infectious etiology vs possible pe as pt high risk with known malignancy. pt will need anticoagulation as high chads score with new onset afib and further cardiac eval. will rate control. prior to starting anticoagulation will CT head. plan to admit 76M hx lymphoma actively treated with chemo presents with new-onset afib/aflutter.  at this time hemodynamically stable with IVF.  will rate controll with cardizem.  r/o PE withd-dimer given ca hx, will progress to CTA if positive.  no apparent infectious etiology at this time.  will admit for anticoagulation in consultation with hematology pending negative CTH.  Attending Mustapha: 77 y/o male with multiple medical issues presenting with palpitations. upon arrival found to be in rapid afib. pt not on anticoagulation. no HA or neurologic deficits. afebrile. concern for infectious etiology vs possible pe as pt high risk with known malignancy. additionally with new onset afib will send cardiac enzymes and extended electroyltes. pt will need anticoagulation as high chads score with new onset afib and further cardiac eval. will rate control. prior to starting anticoagulation will CT head. plan to admit

## 2017-02-21 NOTE — H&P ADULT. - PROBLEM SELECTOR PLAN 4
- Obtain wound care consult H/o MRSA infection, also involving spine. On Minocycline for prophylaxis. No acute issues currently.   - Obtain wound care consult  - Hold Minocycline given initiation of other abx   - Obtain ID consult and imaging if blood cxs positive or suspect new infection

## 2017-02-21 NOTE — H&P ADULT. - ASSESSMENT
77 yo M with h/o high-grade diffuse large B cell lymphoma (on chemo, last tx 2/15/17), prostate cancer (diag in 2008, no tx currently), colon cancer s/p colectomy (1995, in remission), basal cell carcinoma s/p resection, CAD, PVD, chronic osteomyelitis of L heel, spinal abscess with osteomyelitis with MRSA s/p laminectomy, and cataracts presents from [facility] after pt was found to be in new-onset rapid afib to the 150s, a/w for new-onset afib with RVR. 75 yo M with h/o high-grade diffuse large B cell lymphoma (on chemo, last tx 2/15/17), prostate cancer (diag in 2008, no tx currently), colon cancer s/p colectomy (1995, in remission), basal cell carcinoma s/p resection, CAD, PVD, chronic osteomyelitis of L heel, spinal abscess with osteomyelitis with MRSA s/p laminectomy, and cataracts presents from [facility] after pt was found to be in new-onset rapid afib to the 150s, a/w for new-onset afib with RVR and possible PNA given CTA chest findings of scattered areas of nodularity within the right upper and middle lobes.

## 2017-02-22 LAB
ALBUMIN SERPL ELPH-MCNC: 3.2 G/DL — LOW (ref 3.3–5)
ALP SERPL-CCNC: 114 U/L — SIGNIFICANT CHANGE UP (ref 40–120)
ALT FLD-CCNC: 12 U/L RC — SIGNIFICANT CHANGE UP (ref 10–45)
ANION GAP SERPL CALC-SCNC: 13 MMOL/L — SIGNIFICANT CHANGE UP (ref 5–17)
APTT BLD: 117.7 SEC — HIGH (ref 27.5–37.4)
APTT BLD: 80.4 SEC — HIGH (ref 27.5–37.4)
AST SERPL-CCNC: 13 U/L — SIGNIFICANT CHANGE UP (ref 10–40)
BASOPHILS # BLD AUTO: 0 K/UL — SIGNIFICANT CHANGE UP (ref 0–0.2)
BILIRUB SERPL-MCNC: 0.6 MG/DL — SIGNIFICANT CHANGE UP (ref 0.2–1.2)
BLD GP AB SCN SERPL QL: NEGATIVE — SIGNIFICANT CHANGE UP
BUN SERPL-MCNC: 15 MG/DL — SIGNIFICANT CHANGE UP (ref 7–23)
CALCIUM SERPL-MCNC: 8.5 MG/DL — SIGNIFICANT CHANGE UP (ref 8.4–10.5)
CHLORIDE SERPL-SCNC: 100 MMOL/L — SIGNIFICANT CHANGE UP (ref 96–108)
CO2 SERPL-SCNC: 27 MMOL/L — SIGNIFICANT CHANGE UP (ref 22–31)
CREAT SERPL-MCNC: 0.45 MG/DL — LOW (ref 0.5–1.3)
EOSINOPHIL # BLD AUTO: 0.2 K/UL — SIGNIFICANT CHANGE UP (ref 0–0.5)
EOSINOPHIL NFR BLD AUTO: 1 % — SIGNIFICANT CHANGE UP (ref 0–6)
GLUCOSE SERPL-MCNC: 147 MG/DL — HIGH (ref 70–99)
HCT VFR BLD CALC: 30.7 % — LOW (ref 39–50)
HCT VFR BLD CALC: 33.2 % — LOW (ref 39–50)
HGB BLD-MCNC: 10.9 G/DL — LOW (ref 13–17)
HGB BLD-MCNC: 9.9 G/DL — LOW (ref 13–17)
INR BLD: 1.1 RATIO — SIGNIFICANT CHANGE UP (ref 0.88–1.16)
LYMPHOCYTES # BLD AUTO: 1 K/UL — SIGNIFICANT CHANGE UP (ref 1–3.3)
LYMPHOCYTES # BLD AUTO: 8 % — LOW (ref 13–44)
MAGNESIUM SERPL-MCNC: 1.6 MG/DL — SIGNIFICANT CHANGE UP (ref 1.6–2.6)
MCHC RBC-ENTMCNC: 30.5 PG — SIGNIFICANT CHANGE UP (ref 27–34)
MCHC RBC-ENTMCNC: 31.2 PG — SIGNIFICANT CHANGE UP (ref 27–34)
MCHC RBC-ENTMCNC: 32.2 GM/DL — SIGNIFICANT CHANGE UP (ref 32–36)
MCHC RBC-ENTMCNC: 32.8 GM/DL — SIGNIFICANT CHANGE UP (ref 32–36)
MCV RBC AUTO: 94.7 FL — SIGNIFICANT CHANGE UP (ref 80–100)
MCV RBC AUTO: 95.1 FL — SIGNIFICANT CHANGE UP (ref 80–100)
MONOCYTES # BLD AUTO: 0.3 K/UL — SIGNIFICANT CHANGE UP (ref 0–0.9)
MONOCYTES NFR BLD AUTO: 5 % — SIGNIFICANT CHANGE UP (ref 2–14)
NEUTROPHILS # BLD AUTO: 11.9 K/UL — HIGH (ref 1.8–7.4)
NEUTROPHILS NFR BLD AUTO: 81 % — HIGH (ref 43–77)
PHOSPHATE SERPL-MCNC: 4.2 MG/DL — SIGNIFICANT CHANGE UP (ref 2.5–4.5)
PLATELET # BLD AUTO: 90 K/UL — LOW (ref 150–400)
PLATELET # BLD AUTO: 91 K/UL — LOW (ref 150–400)
POTASSIUM SERPL-MCNC: 3.7 MMOL/L — SIGNIFICANT CHANGE UP (ref 3.5–5.3)
POTASSIUM SERPL-SCNC: 3.7 MMOL/L — SIGNIFICANT CHANGE UP (ref 3.5–5.3)
PROT SERPL-MCNC: 5.1 G/DL — LOW (ref 6–8.3)
PROTHROM AB SERPL-ACNC: 12 SEC — SIGNIFICANT CHANGE UP (ref 10–13.1)
RBC # BLD: 3.24 M/UL — LOW (ref 4.2–5.8)
RBC # BLD: 3.49 M/UL — LOW (ref 4.2–5.8)
RBC # FLD: 14.2 % — SIGNIFICANT CHANGE UP (ref 10.3–14.5)
RBC # FLD: 14.4 % — SIGNIFICANT CHANGE UP (ref 10.3–14.5)
RH IG SCN BLD-IMP: POSITIVE — SIGNIFICANT CHANGE UP
SODIUM SERPL-SCNC: 140 MMOL/L — SIGNIFICANT CHANGE UP (ref 135–145)
WBC # BLD: 13.3 K/UL — HIGH (ref 3.8–10.5)
WBC # BLD: 22.1 K/UL — HIGH (ref 3.8–10.5)
WBC # FLD AUTO: 13.3 K/UL — HIGH (ref 3.8–10.5)
WBC # FLD AUTO: 22.1 K/UL — HIGH (ref 3.8–10.5)

## 2017-02-22 PROCEDURE — 99223 1ST HOSP IP/OBS HIGH 75: CPT | Mod: GC

## 2017-02-22 PROCEDURE — 99222 1ST HOSP IP/OBS MODERATE 55: CPT

## 2017-02-22 PROCEDURE — 99233 SBSQ HOSP IP/OBS HIGH 50: CPT

## 2017-02-22 PROCEDURE — 99223 1ST HOSP IP/OBS HIGH 75: CPT

## 2017-02-22 PROCEDURE — 93010 ELECTROCARDIOGRAM REPORT: CPT

## 2017-02-22 PROCEDURE — 93306 TTE W/DOPPLER COMPLETE: CPT | Mod: 26

## 2017-02-22 RX ORDER — APIXABAN 2.5 MG/1
5 TABLET, FILM COATED ORAL
Qty: 0 | Refills: 0 | Status: DISCONTINUED | OUTPATIENT
Start: 2017-02-22 | End: 2017-02-23

## 2017-02-22 RX ORDER — MAGNESIUM SULFATE 500 MG/ML
2 VIAL (ML) INJECTION ONCE
Qty: 0 | Refills: 0 | Status: COMPLETED | OUTPATIENT
Start: 2017-02-22 | End: 2017-02-22

## 2017-02-22 RX ORDER — IPRATROPIUM/ALBUTEROL SULFATE 18-103MCG
3 AEROSOL WITH ADAPTER (GRAM) INHALATION EVERY 6 HOURS
Qty: 0 | Refills: 0 | Status: DISCONTINUED | OUTPATIENT
Start: 2017-02-22 | End: 2017-02-23

## 2017-02-22 RX ORDER — POTASSIUM CHLORIDE 20 MEQ
40 PACKET (EA) ORAL ONCE
Qty: 0 | Refills: 0 | Status: COMPLETED | OUTPATIENT
Start: 2017-02-22 | End: 2017-02-22

## 2017-02-22 RX ADMIN — PIPERACILLIN AND TAZOBACTAM 25 GRAM(S): 4; .5 INJECTION, POWDER, LYOPHILIZED, FOR SOLUTION INTRAVENOUS at 09:41

## 2017-02-22 RX ADMIN — Medication 250 MILLIGRAM(S): at 00:50

## 2017-02-22 RX ADMIN — HEPARIN SODIUM 1900 UNIT(S)/HR: 5000 INJECTION INTRAVENOUS; SUBCUTANEOUS at 09:37

## 2017-02-22 RX ADMIN — HEPARIN SODIUM 1700 UNIT(S)/HR: 5000 INJECTION INTRAVENOUS; SUBCUTANEOUS at 16:52

## 2017-02-22 RX ADMIN — HEPARIN SODIUM 1700 UNIT(S)/HR: 5000 INJECTION INTRAVENOUS; SUBCUTANEOUS at 03:26

## 2017-02-22 RX ADMIN — Medication 81 MILLIGRAM(S): at 11:47

## 2017-02-22 RX ADMIN — PIPERACILLIN AND TAZOBACTAM 200 GRAM(S): 4; .5 INJECTION, POWDER, LYOPHILIZED, FOR SOLUTION INTRAVENOUS at 02:15

## 2017-02-22 RX ADMIN — HEPARIN SODIUM 3500 UNIT(S): 5000 INJECTION INTRAVENOUS; SUBCUTANEOUS at 09:41

## 2017-02-22 RX ADMIN — APIXABAN 5 MILLIGRAM(S): 2.5 TABLET, FILM COATED ORAL at 21:11

## 2017-02-22 RX ADMIN — Medication 50 GRAM(S): at 21:11

## 2017-02-22 RX ADMIN — Medication 40 MILLIEQUIVALENT(S): at 21:11

## 2017-02-23 ENCOUNTER — TRANSCRIPTION ENCOUNTER (OUTPATIENT)
Age: 77
End: 2017-02-23

## 2017-02-23 VITALS
TEMPERATURE: 98 F | RESPIRATION RATE: 17 BRPM | DIASTOLIC BLOOD PRESSURE: 60 MMHG | SYSTOLIC BLOOD PRESSURE: 112 MMHG | OXYGEN SATURATION: 100 % | HEART RATE: 76 BPM

## 2017-02-23 LAB
ANION GAP SERPL CALC-SCNC: 6 MMOL/L — SIGNIFICANT CHANGE UP (ref 5–17)
APTT BLD: 31.7 SEC — SIGNIFICANT CHANGE UP (ref 27.5–37.4)
BUN SERPL-MCNC: 9 MG/DL — SIGNIFICANT CHANGE UP (ref 7–23)
CALCIUM SERPL-MCNC: 8.3 MG/DL — LOW (ref 8.4–10.5)
CHLORIDE SERPL-SCNC: 103 MMOL/L — SIGNIFICANT CHANGE UP (ref 96–108)
CO2 SERPL-SCNC: 31 MMOL/L — SIGNIFICANT CHANGE UP (ref 22–31)
CREAT SERPL-MCNC: 0.41 MG/DL — LOW (ref 0.5–1.3)
GLUCOSE SERPL-MCNC: 106 MG/DL — HIGH (ref 70–99)
HCT VFR BLD CALC: 27.9 % — LOW (ref 39–50)
HGB BLD-MCNC: 9.2 G/DL — LOW (ref 13–17)
INR BLD: 1.05 RATIO — SIGNIFICANT CHANGE UP (ref 0.88–1.16)
MCHC RBC-ENTMCNC: 31.1 PG — SIGNIFICANT CHANGE UP (ref 27–34)
MCHC RBC-ENTMCNC: 32.9 GM/DL — SIGNIFICANT CHANGE UP (ref 32–36)
MCV RBC AUTO: 94.4 FL — SIGNIFICANT CHANGE UP (ref 80–100)
PLATELET # BLD AUTO: 79 K/UL — LOW (ref 150–400)
POTASSIUM SERPL-MCNC: 4.1 MMOL/L — SIGNIFICANT CHANGE UP (ref 3.5–5.3)
POTASSIUM SERPL-SCNC: 4.1 MMOL/L — SIGNIFICANT CHANGE UP (ref 3.5–5.3)
PROTHROM AB SERPL-ACNC: 11.5 SEC — SIGNIFICANT CHANGE UP (ref 10–13.1)
RBC # BLD: 2.96 M/UL — LOW (ref 4.2–5.8)
RBC # FLD: 13.8 % — SIGNIFICANT CHANGE UP (ref 10.3–14.5)
SODIUM SERPL-SCNC: 140 MMOL/L — SIGNIFICANT CHANGE UP (ref 135–145)
WBC # BLD: 5 K/UL — SIGNIFICANT CHANGE UP (ref 3.8–10.5)
WBC # FLD AUTO: 5 K/UL — SIGNIFICANT CHANGE UP (ref 3.8–10.5)

## 2017-02-23 PROCEDURE — 83735 ASSAY OF MAGNESIUM: CPT

## 2017-02-23 PROCEDURE — 93306 TTE W/DOPPLER COMPLETE: CPT

## 2017-02-23 PROCEDURE — 83880 ASSAY OF NATRIURETIC PEPTIDE: CPT

## 2017-02-23 PROCEDURE — 85610 PROTHROMBIN TIME: CPT

## 2017-02-23 PROCEDURE — 80048 BASIC METABOLIC PNL TOTAL CA: CPT

## 2017-02-23 PROCEDURE — 86901 BLOOD TYPING SEROLOGIC RH(D): CPT

## 2017-02-23 PROCEDURE — 84100 ASSAY OF PHOSPHORUS: CPT

## 2017-02-23 PROCEDURE — 99285 EMERGENCY DEPT VISIT HI MDM: CPT | Mod: 25

## 2017-02-23 PROCEDURE — 87633 RESP VIRUS 12-25 TARGETS: CPT

## 2017-02-23 PROCEDURE — 87486 CHLMYD PNEUM DNA AMP PROBE: CPT

## 2017-02-23 PROCEDURE — 87040 BLOOD CULTURE FOR BACTERIA: CPT

## 2017-02-23 PROCEDURE — 86850 RBC ANTIBODY SCREEN: CPT

## 2017-02-23 PROCEDURE — 85730 THROMBOPLASTIN TIME PARTIAL: CPT

## 2017-02-23 PROCEDURE — 93308 TTE F-UP OR LMTD: CPT

## 2017-02-23 PROCEDURE — 93005 ELECTROCARDIOGRAM TRACING: CPT

## 2017-02-23 PROCEDURE — 99232 SBSQ HOSP IP/OBS MODERATE 35: CPT | Mod: GC

## 2017-02-23 PROCEDURE — 99239 HOSP IP/OBS DSCHRG MGMT >30: CPT

## 2017-02-23 PROCEDURE — 84443 ASSAY THYROID STIM HORMONE: CPT

## 2017-02-23 PROCEDURE — 81001 URINALYSIS AUTO W/SCOPE: CPT

## 2017-02-23 PROCEDURE — 70450 CT HEAD/BRAIN W/O DYE: CPT

## 2017-02-23 PROCEDURE — 71045 X-RAY EXAM CHEST 1 VIEW: CPT

## 2017-02-23 PROCEDURE — 85027 COMPLETE CBC AUTOMATED: CPT

## 2017-02-23 PROCEDURE — 87581 M.PNEUMON DNA AMP PROBE: CPT

## 2017-02-23 PROCEDURE — 85379 FIBRIN DEGRADATION QUANT: CPT

## 2017-02-23 PROCEDURE — 87798 DETECT AGENT NOS DNA AMP: CPT

## 2017-02-23 PROCEDURE — 84484 ASSAY OF TROPONIN QUANT: CPT

## 2017-02-23 PROCEDURE — 94640 AIRWAY INHALATION TREATMENT: CPT

## 2017-02-23 PROCEDURE — 86900 BLOOD TYPING SEROLOGIC ABO: CPT

## 2017-02-23 PROCEDURE — 96374 THER/PROPH/DIAG INJ IV PUSH: CPT | Mod: XU

## 2017-02-23 PROCEDURE — 82550 ASSAY OF CK (CPK): CPT

## 2017-02-23 PROCEDURE — 82553 CREATINE MB FRACTION: CPT

## 2017-02-23 PROCEDURE — 71275 CT ANGIOGRAPHY CHEST: CPT

## 2017-02-23 PROCEDURE — 80053 COMPREHEN METABOLIC PANEL: CPT

## 2017-02-23 RX ORDER — IPRATROPIUM/ALBUTEROL SULFATE 18-103MCG
3 AEROSOL WITH ADAPTER (GRAM) INHALATION
Qty: 0 | Refills: 0 | COMMUNITY
Start: 2017-02-23

## 2017-02-23 RX ORDER — APIXABAN 2.5 MG/1
1 TABLET, FILM COATED ORAL
Qty: 60 | Refills: 3 | OUTPATIENT
Start: 2017-02-23 | End: 2017-06-22

## 2017-02-23 RX ADMIN — Medication 81 MILLIGRAM(S): at 13:03

## 2017-02-23 RX ADMIN — APIXABAN 5 MILLIGRAM(S): 2.5 TABLET, FILM COATED ORAL at 05:04

## 2017-02-23 NOTE — DISCHARGE NOTE ADULT - PATIENT PORTAL LINK FT
“You can access the Follow[employer] Patient Portal, offered by [facility], by registering with the following website: [email or web address]”

## 2017-02-23 NOTE — DISCHARGE NOTE ADULT - MEDICATION SUMMARY - MEDICATIONS TO TAKE
I will START or STAY ON the medications listed below when I get home from the hospital:    aspirin 81 mg oral tablet  -- 1 tab(s) by mouth once a day  -- Indication: For home med    Cardizem  mg/24 hours oral capsule, extended release  -- 1 cap(s) by mouth once a day  -- Indication: For Atrial fibrillation    Cardizem  mg/24 hours oral capsule, extended release  -- 1 cap(s) by mouth once a day  -- It is very important that you take or use this exactly as directed.  Do not skip doses or discontinue unless directed by your doctor.  Some non-prescription drugs may aggravate your condition.  Read all labels carefully.  If a warning appears, check with your doctor before taking.  Swallow whole.  Do not crush.    -- Indication: For Atrial fibrillation    apixaban 5 mg oral tablet  -- 1 tab(s) by mouth 2 times a day  -- Indication: For Atrial fibrillation    DuoNeb 0.5 mg-2.5 mg/3 mL inhalation solution  -- 3 milliliter(s) inhaled every 6 hours, As needed, Shortness of Breath and/or Wheezing  -- Indication: For COPD (chronic obstructive pulmonary disease)    minocycline 100 mg oral tablet  -- 1 tab(s) by mouth 2 times a day  -- Indication: For home med

## 2017-02-23 NOTE — DISCHARGE NOTE ADULT - CARE PROVIDER_API CALL
Chon Wong (MD), Internal Medicine  [address] [place] [address]  Phone: [phone]  Fax: [phone]

## 2017-02-23 NOTE — DISCHARGE NOTE ADULT - HOSPITAL COURSE
to be completed by hospiitalist Pt admitted to telemetry for new onset atrial fibrillation, which self converted back to sinus rhythm. Pt evaluated by cardiology and recommended heparin gtt initially as well as cardizem for rate control. given patients thrombocytopenia, hematology was asked to comment on a safe choice of anticoagulation. Per heme, there was no contraindication for a NOAC to be used, and pt was subsequently transitioned to eliquis for longterm AC. Pt remained in sinus rhythm throughout his hospital course, he underwent TTE which revealed normal systolic function and normal LA size. medically stable for discharge on eliquis and cardizem with close cardiology follow up.

## 2017-02-23 NOTE — DISCHARGE NOTE ADULT - CARE PLAN
Principal Discharge DX:	Afib  Goal:	maintain normal heart rate and rhythm  Instructions for follow-up, activity and diet:	F/u with Dr. Wong

## 2017-02-27 LAB
CULTURE RESULTS: SIGNIFICANT CHANGE UP
CULTURE RESULTS: SIGNIFICANT CHANGE UP
SPECIMEN SOURCE: SIGNIFICANT CHANGE UP
SPECIMEN SOURCE: SIGNIFICANT CHANGE UP

## 2017-03-01 DIAGNOSIS — R00.2 PALPITATIONS: ICD-10-CM

## 2017-03-05 ENCOUNTER — FORM ENCOUNTER (OUTPATIENT)
Age: 77
End: 2017-03-05

## 2017-03-06 ENCOUNTER — APPOINTMENT (OUTPATIENT)
Dept: NUCLEAR MEDICINE | Facility: CLINIC | Age: 77
End: 2017-03-06

## 2017-03-06 ENCOUNTER — OUTPATIENT (OUTPATIENT)
Dept: OUTPATIENT SERVICES | Facility: HOSPITAL | Age: 77
LOS: 1 days | End: 2017-03-06
Payer: MEDICARE

## 2017-03-06 DIAGNOSIS — C83.30 DIFFUSE LARGE B-CELL LYMPHOMA, UNSPECIFIED SITE: ICD-10-CM

## 2017-03-06 DIAGNOSIS — Z98.1 ARTHRODESIS STATUS: Chronic | ICD-10-CM

## 2017-03-06 DIAGNOSIS — Z98.89 OTHER SPECIFIED POSTPROCEDURAL STATES: Chronic | ICD-10-CM

## 2017-03-06 PROCEDURE — 78815 PET IMAGE W/CT SKULL-THIGH: CPT

## 2017-03-06 PROCEDURE — A9552: CPT

## 2017-03-07 ENCOUNTER — RESULT REVIEW (OUTPATIENT)
Age: 77
End: 2017-03-07

## 2017-03-08 ENCOUNTER — LABORATORY RESULT (OUTPATIENT)
Age: 77
End: 2017-03-08

## 2017-03-08 ENCOUNTER — APPOINTMENT (OUTPATIENT)
Dept: INFUSION THERAPY | Facility: HOSPITAL | Age: 77
End: 2017-03-08

## 2017-03-08 LAB
EOSINOPHIL # BLD AUTO: 0.2 K/UL — SIGNIFICANT CHANGE UP (ref 0–0.5)
EOSINOPHIL NFR BLD AUTO: 6 % — SIGNIFICANT CHANGE UP (ref 0–6)
HCT VFR BLD CALC: 34.2 % — LOW (ref 39–50)
HGB BLD-MCNC: 11.4 G/DL — LOW (ref 13–17)
LYMPHOCYTES # BLD AUTO: 0.9 K/UL — LOW (ref 1–3.3)
LYMPHOCYTES # BLD AUTO: 18 % — SIGNIFICANT CHANGE UP (ref 13–44)
MCHC RBC-ENTMCNC: 30.4 PG — SIGNIFICANT CHANGE UP (ref 27–34)
MCHC RBC-ENTMCNC: 33.3 G/DL — SIGNIFICANT CHANGE UP (ref 32–36)
MCV RBC AUTO: 91.3 FL — SIGNIFICANT CHANGE UP (ref 80–100)
MONOCYTES # BLD AUTO: 0.9 K/UL — SIGNIFICANT CHANGE UP (ref 0–0.9)
MONOCYTES NFR BLD AUTO: 14 % — SIGNIFICANT CHANGE UP (ref 2–14)
NEUTROPHILS # BLD AUTO: 2.8 K/UL — SIGNIFICANT CHANGE UP (ref 1.8–7.4)
NEUTROPHILS NFR BLD AUTO: 59 % — SIGNIFICANT CHANGE UP (ref 43–77)
NEUTS BAND # BLD: 3 % — SIGNIFICANT CHANGE UP (ref 0–8)
PLAT MORPH BLD: NORMAL — SIGNIFICANT CHANGE UP
PLATELET # BLD AUTO: 194 K/UL — SIGNIFICANT CHANGE UP (ref 150–400)
RBC # BLD: 3.75 M/UL — LOW (ref 4.2–5.8)
RBC # FLD: 14.5 % — SIGNIFICANT CHANGE UP (ref 10.3–14.5)
RBC BLD AUTO: SIGNIFICANT CHANGE UP
WBC # BLD: 4.8 K/UL — SIGNIFICANT CHANGE UP (ref 3.8–10.5)
WBC # FLD AUTO: 4.8 K/UL — SIGNIFICANT CHANGE UP (ref 3.8–10.5)

## 2017-03-09 ENCOUNTER — APPOINTMENT (OUTPATIENT)
Dept: INFUSION THERAPY | Facility: HOSPITAL | Age: 77
End: 2017-03-09

## 2017-03-10 ENCOUNTER — APPOINTMENT (OUTPATIENT)
Dept: INFUSION THERAPY | Facility: HOSPITAL | Age: 77
End: 2017-03-10

## 2017-03-11 ENCOUNTER — APPOINTMENT (OUTPATIENT)
Dept: INFUSION THERAPY | Facility: HOSPITAL | Age: 77
End: 2017-03-11

## 2017-03-12 ENCOUNTER — RESULT REVIEW (OUTPATIENT)
Age: 77
End: 2017-03-12

## 2017-03-13 ENCOUNTER — APPOINTMENT (OUTPATIENT)
Dept: HEMATOLOGY ONCOLOGY | Facility: CLINIC | Age: 77
End: 2017-03-13

## 2017-03-13 VITALS
SYSTOLIC BLOOD PRESSURE: 132 MMHG | TEMPERATURE: 98.5 F | WEIGHT: 193.56 LBS | DIASTOLIC BLOOD PRESSURE: 69 MMHG | RESPIRATION RATE: 16 BRPM | HEART RATE: 80 BPM | OXYGEN SATURATION: 96 % | BODY MASS INDEX: 25.11 KG/M2

## 2017-03-13 LAB
BASOPHILS # BLD AUTO: 0 K/UL — SIGNIFICANT CHANGE UP (ref 0–0.2)
EOSINOPHIL # BLD AUTO: 0 K/UL — SIGNIFICANT CHANGE UP (ref 0–0.5)
HCT VFR BLD CALC: 31.3 % — LOW (ref 39–50)
HGB BLD-MCNC: 10.4 G/DL — LOW (ref 13–17)
LYMPHOCYTES # BLD AUTO: 0.5 K/UL — LOW (ref 1–3.3)
MCHC RBC-ENTMCNC: 30.8 PG — SIGNIFICANT CHANGE UP (ref 27–34)
MCHC RBC-ENTMCNC: 33.1 G/DL — SIGNIFICANT CHANGE UP (ref 32–36)
MCV RBC AUTO: 93.1 FL — SIGNIFICANT CHANGE UP (ref 80–100)
MONOCYTES # BLD AUTO: 0.3 K/UL — SIGNIFICANT CHANGE UP (ref 0–0.9)
MYELOCYTES NFR BLD: 1 % — HIGH (ref 0–0)
NEUTROPHILS # BLD AUTO: 47.3 K/UL — HIGH (ref 1.8–7.4)
NEUTROPHILS NFR BLD AUTO: 93 % — HIGH (ref 43–77)
NEUTS BAND # BLD: 6 % — SIGNIFICANT CHANGE UP (ref 0–8)
PLAT MORPH BLD: NORMAL — SIGNIFICANT CHANGE UP
PLATELET # BLD AUTO: 170 K/UL — SIGNIFICANT CHANGE UP (ref 150–400)
RBC # BLD: 3.36 M/UL — LOW (ref 4.2–5.8)
RBC # FLD: 14.4 % — SIGNIFICANT CHANGE UP (ref 10.3–14.5)
RBC BLD AUTO: SIGNIFICANT CHANGE UP
WBC # BLD: 48.1 K/UL — CRITICAL HIGH (ref 3.8–10.5)
WBC # FLD AUTO: 48.1 K/UL — CRITICAL HIGH (ref 3.8–10.5)

## 2017-03-23 ENCOUNTER — LABORATORY RESULT (OUTPATIENT)
Age: 77
End: 2017-03-23

## 2017-03-24 ENCOUNTER — APPOINTMENT (OUTPATIENT)
Dept: CARDIOLOGY | Facility: CLINIC | Age: 77
End: 2017-03-24

## 2017-03-24 VITALS
OXYGEN SATURATION: 95 % | HEIGHT: 73.62 IN | BODY MASS INDEX: 24 KG/M2 | SYSTOLIC BLOOD PRESSURE: 116 MMHG | DIASTOLIC BLOOD PRESSURE: 66 MMHG | WEIGHT: 185 LBS | HEART RATE: 87 BPM

## 2017-03-28 ENCOUNTER — RESULT REVIEW (OUTPATIENT)
Age: 77
End: 2017-03-28

## 2017-03-28 ENCOUNTER — OUTPATIENT (OUTPATIENT)
Dept: OUTPATIENT SERVICES | Facility: HOSPITAL | Age: 77
LOS: 1 days | Discharge: HOME | End: 2017-03-28

## 2017-03-28 DIAGNOSIS — Z98.1 ARTHRODESIS STATUS: Chronic | ICD-10-CM

## 2017-03-28 DIAGNOSIS — C85.88 OTHER SPECIFIED TYPES OF NON-HODGKIN LYMPHOMA, LYMPH NODES OF MULTIPLE SITES: ICD-10-CM

## 2017-03-28 DIAGNOSIS — Z98.89 OTHER SPECIFIED POSTPROCEDURAL STATES: Chronic | ICD-10-CM

## 2017-03-29 ENCOUNTER — LABORATORY RESULT (OUTPATIENT)
Age: 77
End: 2017-03-29

## 2017-03-29 ENCOUNTER — APPOINTMENT (OUTPATIENT)
Dept: INFUSION THERAPY | Facility: HOSPITAL | Age: 77
End: 2017-03-29

## 2017-03-29 LAB
BASOPHILS # BLD AUTO: 0.1 K/UL — SIGNIFICANT CHANGE UP (ref 0–0.2)
BASOPHILS NFR BLD AUTO: 0.9 % — SIGNIFICANT CHANGE UP (ref 0–2)
EOSINOPHIL # BLD AUTO: 0.2 K/UL — SIGNIFICANT CHANGE UP (ref 0–0.5)
EOSINOPHIL NFR BLD AUTO: 3 % — SIGNIFICANT CHANGE UP (ref 0–6)
HCT VFR BLD CALC: 32.4 % — LOW (ref 39–50)
HGB BLD-MCNC: 10.7 G/DL — LOW (ref 13–17)
LYMPHOCYTES # BLD AUTO: 0.8 K/UL — LOW (ref 1–3.3)
LYMPHOCYTES # BLD AUTO: 14.6 % — SIGNIFICANT CHANGE UP (ref 13–44)
MCHC RBC-ENTMCNC: 30.5 PG — SIGNIFICANT CHANGE UP (ref 27–34)
MCHC RBC-ENTMCNC: 32.9 G/DL — SIGNIFICANT CHANGE UP (ref 32–36)
MCV RBC AUTO: 92.7 FL — SIGNIFICANT CHANGE UP (ref 80–100)
MONOCYTES # BLD AUTO: 0.9 K/UL — SIGNIFICANT CHANGE UP (ref 0–0.9)
MONOCYTES NFR BLD AUTO: 15.5 % — HIGH (ref 2–14)
NEUTROPHILS # BLD AUTO: 3.7 K/UL — SIGNIFICANT CHANGE UP (ref 1.8–7.4)
NEUTROPHILS NFR BLD AUTO: 65.9 % — SIGNIFICANT CHANGE UP (ref 43–77)
PLATELET # BLD AUTO: 228 K/UL — SIGNIFICANT CHANGE UP (ref 150–400)
RBC # BLD: 3.5 M/UL — LOW (ref 4.2–5.8)
RBC # FLD: 15.1 % — HIGH (ref 10.3–14.5)
WBC # BLD: 5.6 K/UL — SIGNIFICANT CHANGE UP (ref 3.8–10.5)
WBC # FLD AUTO: 5.6 K/UL — SIGNIFICANT CHANGE UP (ref 3.8–10.5)

## 2017-03-30 ENCOUNTER — APPOINTMENT (OUTPATIENT)
Dept: INFUSION THERAPY | Facility: HOSPITAL | Age: 77
End: 2017-03-30

## 2017-03-30 DIAGNOSIS — Z51.11 ENCOUNTER FOR ANTINEOPLASTIC CHEMOTHERAPY: ICD-10-CM

## 2017-03-30 DIAGNOSIS — R11.2 NAUSEA WITH VOMITING, UNSPECIFIED: ICD-10-CM

## 2017-03-31 ENCOUNTER — APPOINTMENT (OUTPATIENT)
Dept: INFUSION THERAPY | Facility: HOSPITAL | Age: 77
End: 2017-03-31

## 2017-04-01 ENCOUNTER — APPOINTMENT (OUTPATIENT)
Dept: INFUSION THERAPY | Facility: HOSPITAL | Age: 77
End: 2017-04-01

## 2017-04-03 ENCOUNTER — RESULT REVIEW (OUTPATIENT)
Age: 77
End: 2017-04-03

## 2017-04-03 DIAGNOSIS — Z51.89 ENCOUNTER FOR OTHER SPECIFIED AFTERCARE: ICD-10-CM

## 2017-04-04 ENCOUNTER — APPOINTMENT (OUTPATIENT)
Dept: HEMATOLOGY ONCOLOGY | Facility: CLINIC | Age: 77
End: 2017-04-04

## 2017-04-04 VITALS
BODY MASS INDEX: 24.88 KG/M2 | OXYGEN SATURATION: 98 % | WEIGHT: 191.8 LBS | DIASTOLIC BLOOD PRESSURE: 60 MMHG | TEMPERATURE: 97.3 F | RESPIRATION RATE: 16 BRPM | HEART RATE: 96 BPM | SYSTOLIC BLOOD PRESSURE: 100 MMHG

## 2017-04-04 DIAGNOSIS — R60.0 LOCALIZED EDEMA: ICD-10-CM

## 2017-04-04 LAB
EOSINOPHIL # BLD AUTO: 0.1 K/UL — SIGNIFICANT CHANGE UP (ref 0–0.5)
EOSINOPHIL NFR BLD AUTO: 1 % — SIGNIFICANT CHANGE UP (ref 0–6)
HCT VFR BLD CALC: 29.4 % — LOW (ref 39–50)
HGB BLD-MCNC: 9.8 G/DL — LOW (ref 13–17)
LYMPHOCYTES # BLD AUTO: 0.5 K/UL — LOW (ref 1–3.3)
LYMPHOCYTES # BLD AUTO: 4 % — LOW (ref 13–44)
MCHC RBC-ENTMCNC: 31.5 PG — SIGNIFICANT CHANGE UP (ref 27–34)
MCHC RBC-ENTMCNC: 33.3 G/DL — SIGNIFICANT CHANGE UP (ref 32–36)
MCV RBC AUTO: 94.7 FL — SIGNIFICANT CHANGE UP (ref 80–100)
MONOCYTES # BLD AUTO: 0.2 K/UL — SIGNIFICANT CHANGE UP (ref 0–0.9)
MONOCYTES NFR BLD AUTO: 6 % — SIGNIFICANT CHANGE UP (ref 2–14)
NEUTROPHILS # BLD AUTO: 22.5 K/UL — HIGH (ref 1.8–7.4)
NEUTROPHILS NFR BLD AUTO: 86 % — HIGH (ref 43–77)
NEUTS BAND # BLD: 3 % — SIGNIFICANT CHANGE UP (ref 0–8)
PLAT MORPH BLD: NORMAL — SIGNIFICANT CHANGE UP
PLATELET # BLD AUTO: 124 K/UL — LOW (ref 150–400)
RBC # BLD: 3.11 M/UL — LOW (ref 4.2–5.8)
RBC # FLD: 14.9 % — HIGH (ref 10.3–14.5)
RBC BLD AUTO: SIGNIFICANT CHANGE UP
WBC # BLD: 23.3 K/UL — HIGH (ref 3.8–10.5)
WBC # FLD AUTO: 23.3 K/UL — HIGH (ref 3.8–10.5)

## 2017-04-05 LAB — PSA SERPL-MCNC: 10.34 NG/ML

## 2017-04-10 ENCOUNTER — APPOINTMENT (OUTPATIENT)
Dept: RADIATION ONCOLOGY | Facility: CLINIC | Age: 77
End: 2017-04-10

## 2017-04-10 VITALS
WEIGHT: 200 LBS | OXYGEN SATURATION: 97 % | BODY MASS INDEX: 25.95 KG/M2 | SYSTOLIC BLOOD PRESSURE: 119 MMHG | HEART RATE: 77 BPM | RESPIRATION RATE: 15 BRPM | DIASTOLIC BLOOD PRESSURE: 56 MMHG

## 2017-04-13 ENCOUNTER — RESULT REVIEW (OUTPATIENT)
Age: 77
End: 2017-04-13

## 2017-04-14 ENCOUNTER — APPOINTMENT (OUTPATIENT)
Dept: HEMATOLOGY ONCOLOGY | Facility: CLINIC | Age: 77
End: 2017-04-14

## 2017-04-14 VITALS
RESPIRATION RATE: 16 BRPM | OXYGEN SATURATION: 97 % | SYSTOLIC BLOOD PRESSURE: 119 MMHG | HEART RATE: 79 BPM | DIASTOLIC BLOOD PRESSURE: 62 MMHG | TEMPERATURE: 98.1 F | WEIGHT: 187.39 LBS | BODY MASS INDEX: 24.31 KG/M2

## 2017-04-14 LAB
BASOPHILS # BLD AUTO: 0 K/UL — SIGNIFICANT CHANGE UP (ref 0–0.2)
BASOPHILS NFR BLD AUTO: 0.4 % — SIGNIFICANT CHANGE UP (ref 0–2)
BUN SERPL-MCNC: 8 MG/DL — SIGNIFICANT CHANGE UP (ref 7–23)
CA-I BLDA-SCNC: 1.15 MMOL/L — SIGNIFICANT CHANGE UP (ref 1.12–1.3)
CHLORIDE SERPL-SCNC: 99 MMOL/L — SIGNIFICANT CHANGE UP (ref 96–108)
CO2 SERPL-SCNC: 29 MMOL/L — SIGNIFICANT CHANGE UP (ref 22–31)
CREAT SERPL-MCNC: 0.6 MG/DL — SIGNIFICANT CHANGE UP (ref 0.5–1.3)
EOSINOPHIL # BLD AUTO: 0.1 K/UL — SIGNIFICANT CHANGE UP (ref 0–0.5)
EOSINOPHIL NFR BLD AUTO: 1.5 % — SIGNIFICANT CHANGE UP (ref 0–6)
GLUCOSE SERPL-MCNC: 97 MG/DL — SIGNIFICANT CHANGE UP (ref 70–99)
HCT VFR BLD CALC: 31.1 % — LOW (ref 39–50)
HGB BLD-MCNC: 10 G/DL — LOW (ref 13–17)
LYMPHOCYTES # BLD AUTO: 0.6 K/UL — LOW (ref 1–3.3)
LYMPHOCYTES # BLD AUTO: 9.2 % — LOW (ref 13–44)
MCHC RBC-ENTMCNC: 30.5 PG — SIGNIFICANT CHANGE UP (ref 27–34)
MCHC RBC-ENTMCNC: 32.2 G/DL — SIGNIFICANT CHANGE UP (ref 32–36)
MCV RBC AUTO: 94.8 FL — SIGNIFICANT CHANGE UP (ref 80–100)
MONOCYTES # BLD AUTO: 0.7 K/UL — SIGNIFICANT CHANGE UP (ref 0–0.9)
MONOCYTES NFR BLD AUTO: 10.9 % — SIGNIFICANT CHANGE UP (ref 2–14)
NEUTROPHILS # BLD AUTO: 5.1 K/UL — SIGNIFICANT CHANGE UP (ref 1.8–7.4)
NEUTROPHILS NFR BLD AUTO: 77.9 % — HIGH (ref 43–77)
PLATELET # BLD AUTO: 153 K/UL — SIGNIFICANT CHANGE UP (ref 150–400)
POTASSIUM SERPL-MCNC: 4.4 MMOL/L — SIGNIFICANT CHANGE UP (ref 3.5–5.3)
POTASSIUM SERPL-SCNC: 4.4 MMOL/L — SIGNIFICANT CHANGE UP (ref 3.5–5.3)
RBC # BLD: 3.28 M/UL — LOW (ref 4.2–5.8)
RBC # FLD: 15.3 % — HIGH (ref 10.3–14.5)
SODIUM SERPL-SCNC: 140 MMOL/L — SIGNIFICANT CHANGE UP (ref 135–145)
WBC # BLD: 6.5 K/UL — SIGNIFICANT CHANGE UP (ref 3.8–10.5)
WBC # FLD AUTO: 6.5 K/UL — SIGNIFICANT CHANGE UP (ref 3.8–10.5)

## 2017-04-16 LAB
C DIFF TOX GENS STL QL NAA+PROBE: NORMAL
CDIFF BY PCR: NOT DETECTED

## 2017-04-18 ENCOUNTER — RESULT REVIEW (OUTPATIENT)
Age: 77
End: 2017-04-18

## 2017-04-19 ENCOUNTER — LABORATORY RESULT (OUTPATIENT)
Age: 77
End: 2017-04-19

## 2017-04-19 ENCOUNTER — APPOINTMENT (OUTPATIENT)
Dept: INFUSION THERAPY | Facility: HOSPITAL | Age: 77
End: 2017-04-19

## 2017-04-19 LAB
BASOPHILS # BLD AUTO: 0 K/UL — SIGNIFICANT CHANGE UP (ref 0–0.2)
BASOPHILS NFR BLD AUTO: 0.8 % — SIGNIFICANT CHANGE UP (ref 0–2)
EOSINOPHIL # BLD AUTO: 0.2 K/UL — SIGNIFICANT CHANGE UP (ref 0–0.5)
EOSINOPHIL NFR BLD AUTO: 3.8 % — SIGNIFICANT CHANGE UP (ref 0–6)
HCT VFR BLD CALC: 30.9 % — LOW (ref 39–50)
HGB BLD-MCNC: 10.3 G/DL — LOW (ref 13–17)
LYMPHOCYTES # BLD AUTO: 0.7 K/UL — LOW (ref 1–3.3)
LYMPHOCYTES # BLD AUTO: 14.3 % — SIGNIFICANT CHANGE UP (ref 13–44)
MCHC RBC-ENTMCNC: 31.2 PG — SIGNIFICANT CHANGE UP (ref 27–34)
MCHC RBC-ENTMCNC: 33.3 G/DL — SIGNIFICANT CHANGE UP (ref 32–36)
MCV RBC AUTO: 93.8 FL — SIGNIFICANT CHANGE UP (ref 80–100)
MONOCYTES # BLD AUTO: 0.7 K/UL — SIGNIFICANT CHANGE UP (ref 0–0.9)
MONOCYTES NFR BLD AUTO: 13.4 % — SIGNIFICANT CHANGE UP (ref 2–14)
NEUTROPHILS # BLD AUTO: 3.5 K/UL — SIGNIFICANT CHANGE UP (ref 1.8–7.4)
NEUTROPHILS NFR BLD AUTO: 67.6 % — SIGNIFICANT CHANGE UP (ref 43–77)
PLATELET # BLD AUTO: 224 K/UL — SIGNIFICANT CHANGE UP (ref 150–400)
RBC # BLD: 3.29 M/UL — LOW (ref 4.2–5.8)
RBC # FLD: 15.3 % — HIGH (ref 10.3–14.5)
WBC # BLD: 5.2 K/UL — SIGNIFICANT CHANGE UP (ref 3.8–10.5)
WBC # FLD AUTO: 5.2 K/UL — SIGNIFICANT CHANGE UP (ref 3.8–10.5)

## 2017-04-20 ENCOUNTER — APPOINTMENT (OUTPATIENT)
Dept: INFUSION THERAPY | Facility: HOSPITAL | Age: 77
End: 2017-04-20

## 2017-04-21 ENCOUNTER — APPOINTMENT (OUTPATIENT)
Dept: INFUSION THERAPY | Facility: HOSPITAL | Age: 77
End: 2017-04-21

## 2017-04-24 ENCOUNTER — RESULT REVIEW (OUTPATIENT)
Age: 77
End: 2017-04-24

## 2017-04-25 ENCOUNTER — APPOINTMENT (OUTPATIENT)
Dept: HEMATOLOGY ONCOLOGY | Facility: CLINIC | Age: 77
End: 2017-04-25

## 2017-04-25 VITALS
TEMPERATURE: 97.6 F | RESPIRATION RATE: 16 BRPM | BODY MASS INDEX: 24.88 KG/M2 | DIASTOLIC BLOOD PRESSURE: 60 MMHG | HEART RATE: 86 BPM | WEIGHT: 191.8 LBS | OXYGEN SATURATION: 97 % | SYSTOLIC BLOOD PRESSURE: 110 MMHG

## 2017-04-25 LAB
ANISOCYTOSIS BLD QL: SLIGHT — SIGNIFICANT CHANGE UP
BASOPHILS # BLD AUTO: 0 K/UL — SIGNIFICANT CHANGE UP (ref 0–0.2)
DOHLE BOD BLD QL SMEAR: PRESENT — SIGNIFICANT CHANGE UP
ELLIPTOCYTES BLD QL SMEAR: SLIGHT — SIGNIFICANT CHANGE UP
EOSINOPHIL # BLD AUTO: 0.2 K/UL — SIGNIFICANT CHANGE UP (ref 0–0.5)
HCT VFR BLD CALC: 27.4 % — LOW (ref 39–50)
HGB BLD-MCNC: 9.2 G/DL — LOW (ref 13–17)
HYPOCHROMIA BLD QL: SLIGHT — SIGNIFICANT CHANGE UP
LYMPHOCYTES # BLD AUTO: 0.6 K/UL — LOW (ref 1–3.3)
LYMPHOCYTES # BLD AUTO: 3 % — LOW (ref 13–44)
MACROCYTES BLD QL: SLIGHT — SIGNIFICANT CHANGE UP
MCHC RBC-ENTMCNC: 31.8 PG — SIGNIFICANT CHANGE UP (ref 27–34)
MCHC RBC-ENTMCNC: 33.5 G/DL — SIGNIFICANT CHANGE UP (ref 32–36)
MCV RBC AUTO: 94.9 FL — SIGNIFICANT CHANGE UP (ref 80–100)
MICROCYTES BLD QL: SLIGHT — SIGNIFICANT CHANGE UP
MONOCYTES # BLD AUTO: 0.2 K/UL — SIGNIFICANT CHANGE UP (ref 0–0.9)
MONOCYTES NFR BLD AUTO: 2 % — SIGNIFICANT CHANGE UP (ref 2–14)
NEUTROPHILS # BLD AUTO: 21.7 K/UL — HIGH (ref 1.8–7.4)
NEUTROPHILS NFR BLD AUTO: 88 % — HIGH (ref 43–77)
NEUTS BAND # BLD: 7 % — SIGNIFICANT CHANGE UP (ref 0–8)
PLAT MORPH BLD: NORMAL — SIGNIFICANT CHANGE UP
PLATELET # BLD AUTO: 117 K/UL — LOW (ref 150–400)
POIKILOCYTOSIS BLD QL AUTO: SLIGHT — SIGNIFICANT CHANGE UP
POLYCHROMASIA BLD QL SMEAR: SLIGHT — SIGNIFICANT CHANGE UP
RBC # BLD: 2.88 M/UL — LOW (ref 4.2–5.8)
RBC # FLD: 15.2 % — HIGH (ref 10.3–14.5)
RBC BLD AUTO: ABNORMAL
WBC # BLD: 22.7 K/UL — HIGH (ref 3.8–10.5)
WBC # FLD AUTO: 22.7 K/UL — HIGH (ref 3.8–10.5)

## 2017-04-25 NOTE — DISCHARGE NOTE ADULT - VISION (WITH CORRECTIVE LENSES IF THE PATIENT USUALLY WEARS THEM):
Partially impaired: cannot see medication labels or newsprint, but can see obstacles in path, and the surrounding layout; can count fingers at arm's length Principal Discharge DX:	Menses, irregular

## 2017-04-27 ENCOUNTER — RESULT REVIEW (OUTPATIENT)
Age: 77
End: 2017-04-27

## 2017-04-27 RX ORDER — AZITHROMYCIN 500 MG/1
0 TABLET, FILM COATED ORAL
Qty: 0 | Refills: 0 | COMMUNITY
Start: 2017-04-27 | End: 2017-05-02

## 2017-04-28 ENCOUNTER — OUTPATIENT (OUTPATIENT)
Dept: OUTPATIENT SERVICES | Facility: HOSPITAL | Age: 77
LOS: 1 days | Discharge: HOME | End: 2017-04-28

## 2017-04-28 ENCOUNTER — APPOINTMENT (OUTPATIENT)
Dept: HEMATOLOGY ONCOLOGY | Facility: CLINIC | Age: 77
End: 2017-04-28

## 2017-04-28 ENCOUNTER — INPATIENT (INPATIENT)
Facility: HOSPITAL | Age: 77
LOS: 2 days | Discharge: HOME | DRG: 202 | End: 2017-05-01
Attending: INTERNAL MEDICINE | Admitting: INTERNAL MEDICINE
Payer: MEDICARE

## 2017-04-28 VITALS
OXYGEN SATURATION: 92 % | DIASTOLIC BLOOD PRESSURE: 40 MMHG | SYSTOLIC BLOOD PRESSURE: 90 MMHG | RESPIRATION RATE: 16 BRPM | HEART RATE: 97 BPM

## 2017-04-28 VITALS
RESPIRATION RATE: 24 BRPM | HEART RATE: 92 BPM | SYSTOLIC BLOOD PRESSURE: 107 MMHG | DIASTOLIC BLOOD PRESSURE: 51 MMHG | OXYGEN SATURATION: 95 %

## 2017-04-28 DIAGNOSIS — D69.6 THROMBOCYTOPENIA, UNSPECIFIED: ICD-10-CM

## 2017-04-28 DIAGNOSIS — Z98.1 ARTHRODESIS STATUS: Chronic | ICD-10-CM

## 2017-04-28 DIAGNOSIS — I48.91 UNSPECIFIED ATRIAL FIBRILLATION: ICD-10-CM

## 2017-04-28 DIAGNOSIS — J18.9 PNEUMONIA, UNSPECIFIED ORGANISM: ICD-10-CM

## 2017-04-28 DIAGNOSIS — C85.88 OTHER SPECIFIED TYPES OF NON-HODGKIN LYMPHOMA, LYMPH NODES OF MULTIPLE SITES: ICD-10-CM

## 2017-04-28 DIAGNOSIS — C83.30 DIFFUSE LARGE B-CELL LYMPHOMA, UNSPECIFIED SITE: ICD-10-CM

## 2017-04-28 DIAGNOSIS — Z29.9 ENCOUNTER FOR PROPHYLACTIC MEASURES, UNSPECIFIED: ICD-10-CM

## 2017-04-28 DIAGNOSIS — Z98.89 OTHER SPECIFIED POSTPROCEDURAL STATES: Chronic | ICD-10-CM

## 2017-04-28 DIAGNOSIS — D64.9 ANEMIA, UNSPECIFIED: ICD-10-CM

## 2017-04-28 LAB
ALBUMIN SERPL ELPH-MCNC: 3.2 G/DL — LOW (ref 3.3–5)
ALP SERPL-CCNC: 82 U/L — SIGNIFICANT CHANGE UP (ref 40–120)
ALT FLD-CCNC: 13 U/L RC — SIGNIFICANT CHANGE UP (ref 10–45)
ANION GAP SERPL CALC-SCNC: 11 MMOL/L — SIGNIFICANT CHANGE UP (ref 5–17)
APTT BLD: 31.5 SEC — SIGNIFICANT CHANGE UP (ref 27.5–37.4)
AST SERPL-CCNC: 16 U/L — SIGNIFICANT CHANGE UP (ref 10–40)
BASE EXCESS BLDV CALC-SCNC: 6.8 MMOL/L — HIGH (ref -2–2)
BASOPHILS # BLD AUTO: 0 K/UL — SIGNIFICANT CHANGE UP (ref 0–0.2)
BASOPHILS NFR BLD AUTO: 1 % — SIGNIFICANT CHANGE UP (ref 0–2)
BILIRUB SERPL-MCNC: 0.4 MG/DL — SIGNIFICANT CHANGE UP (ref 0.2–1.2)
BUN SERPL-MCNC: 11 MG/DL — SIGNIFICANT CHANGE UP (ref 7–23)
CA-I SERPL-SCNC: 1.21 MMOL/L — SIGNIFICANT CHANGE UP (ref 1.12–1.3)
CALCIUM SERPL-MCNC: 8.6 MG/DL — SIGNIFICANT CHANGE UP (ref 8.4–10.5)
CHLORIDE BLDV-SCNC: 100 MMOL/L — SIGNIFICANT CHANGE UP (ref 96–108)
CHLORIDE SERPL-SCNC: 102 MMOL/L — SIGNIFICANT CHANGE UP (ref 96–108)
CO2 BLDV-SCNC: 34 MMOL/L — HIGH (ref 22–30)
CO2 SERPL-SCNC: 29 MMOL/L — SIGNIFICANT CHANGE UP (ref 22–31)
CREAT SERPL-MCNC: 0.55 MG/DL — SIGNIFICANT CHANGE UP (ref 0.5–1.3)
EOSINOPHIL # BLD AUTO: 0 K/UL — SIGNIFICANT CHANGE UP (ref 0–0.5)
EOSINOPHIL # BLD AUTO: 0.1 K/UL — SIGNIFICANT CHANGE UP (ref 0–0.5)
EOSINOPHIL NFR BLD AUTO: 4 % — SIGNIFICANT CHANGE UP (ref 0–6)
GAS PNL BLDV: 137 MMOL/L — SIGNIFICANT CHANGE UP (ref 136–145)
GAS PNL BLDV: SIGNIFICANT CHANGE UP
GAS PNL BLDV: SIGNIFICANT CHANGE UP
GLUCOSE BLDV-MCNC: 103 MG/DL — HIGH (ref 70–99)
GLUCOSE SERPL-MCNC: 106 MG/DL — HIGH (ref 70–99)
HCO3 BLDV-SCNC: 32 MMOL/L — HIGH (ref 21–29)
HCT VFR BLD CALC: 26 % — LOW (ref 39–50)
HCT VFR BLD CALC: 26.8 % — LOW (ref 39–50)
HCT VFR BLDA CALC: 26 % — LOW (ref 39–50)
HGB BLD CALC-MCNC: 8.2 G/DL — LOW (ref 13–17)
HGB BLD-MCNC: 8.6 G/DL — LOW (ref 13–17)
HGB BLD-MCNC: 8.6 G/DL — LOW (ref 13–17)
INR BLD: 1.42 RATIO — HIGH (ref 0.88–1.16)
LACTATE BLDV-MCNC: 0.9 MMOL/L — SIGNIFICANT CHANGE UP (ref 0.7–2)
LYMPHOCYTES # BLD AUTO: 0.3 K/UL — LOW (ref 1–3.3)
LYMPHOCYTES # BLD AUTO: 0.3 K/UL — LOW (ref 1–3.3)
LYMPHOCYTES # BLD AUTO: 11 % — LOW (ref 13–44)
LYMPHOCYTES # BLD AUTO: 6 % — LOW (ref 13–44)
MCHC RBC-ENTMCNC: 30 PG — SIGNIFICANT CHANGE UP (ref 27–34)
MCHC RBC-ENTMCNC: 30.9 PG — SIGNIFICANT CHANGE UP (ref 27–34)
MCHC RBC-ENTMCNC: 31.9 GM/DL — LOW (ref 32–36)
MCHC RBC-ENTMCNC: 32.9 G/DL — SIGNIFICANT CHANGE UP (ref 32–36)
MCV RBC AUTO: 93.7 FL — SIGNIFICANT CHANGE UP (ref 80–100)
MCV RBC AUTO: 94.1 FL — SIGNIFICANT CHANGE UP (ref 80–100)
MONOCYTES # BLD AUTO: 0.5 K/UL — SIGNIFICANT CHANGE UP (ref 0–0.9)
MONOCYTES # BLD AUTO: 0.5 K/UL — SIGNIFICANT CHANGE UP (ref 0–0.9)
MONOCYTES NFR BLD AUTO: 11 % — SIGNIFICANT CHANGE UP (ref 2–14)
MONOCYTES NFR BLD AUTO: 12 % — SIGNIFICANT CHANGE UP (ref 2–14)
NEUTROPHILS # BLD AUTO: 2.8 K/UL — SIGNIFICANT CHANGE UP (ref 1.8–7.4)
NEUTROPHILS # BLD AUTO: 3.4 K/UL — SIGNIFICANT CHANGE UP (ref 1.8–7.4)
NEUTROPHILS NFR BLD AUTO: 67 % — SIGNIFICANT CHANGE UP (ref 43–77)
NEUTROPHILS NFR BLD AUTO: 80 % — HIGH (ref 43–77)
NEUTS BAND # BLD: 2 % — SIGNIFICANT CHANGE UP (ref 0–8)
NEUTS BAND # BLD: 6 % — SIGNIFICANT CHANGE UP (ref 0–8)
OTHER CELLS CSF MANUAL: 4 ML/DL — LOW (ref 18–22)
PCO2 BLDV: 54 MMHG — HIGH (ref 35–50)
PH BLDV: 7.39 — SIGNIFICANT CHANGE UP (ref 7.35–7.45)
PLAT MORPH BLD: NORMAL — SIGNIFICANT CHANGE UP
PLAT MORPH BLD: NORMAL — SIGNIFICANT CHANGE UP
PLATELET # BLD AUTO: 72 K/UL — LOW (ref 150–400)
PLATELET # BLD AUTO: 80 K/UL — SIGNIFICANT CHANGE UP (ref 150–400)
PO2 BLDV: 26 MMHG — SIGNIFICANT CHANGE UP (ref 25–45)
POTASSIUM BLDV-SCNC: 3.7 MMOL/L — SIGNIFICANT CHANGE UP (ref 3.5–5)
POTASSIUM SERPL-MCNC: 4.1 MMOL/L — SIGNIFICANT CHANGE UP (ref 3.5–5.3)
POTASSIUM SERPL-SCNC: 4.1 MMOL/L — SIGNIFICANT CHANGE UP (ref 3.5–5.3)
PROT SERPL-MCNC: 5.5 G/DL — LOW (ref 6–8.3)
PROTHROM AB SERPL-ACNC: 15.6 SEC — HIGH (ref 9.8–12.7)
RAPID RVP RESULT: DETECTED
RBC # BLD: 2.78 M/UL — LOW (ref 4.2–5.8)
RBC # BLD: 2.85 M/UL — LOW (ref 4.2–5.8)
RBC # FLD: 15 % — HIGH (ref 10.3–14.5)
RBC # FLD: 15.3 % — HIGH (ref 10.3–14.5)
RBC BLD AUTO: SIGNIFICANT CHANGE UP
RBC BLD AUTO: SIGNIFICANT CHANGE UP
RV+EV RNA SPEC QL NAA+PROBE: DETECTED
SAO2 % BLDV: 34 % — LOW (ref 67–88)
SODIUM SERPL-SCNC: 142 MMOL/L — SIGNIFICANT CHANGE UP (ref 135–145)
WBC # BLD: 3.7 K/UL — LOW (ref 3.8–10.5)
WBC # BLD: 4.2 K/UL — SIGNIFICANT CHANGE UP (ref 3.8–10.5)
WBC # FLD AUTO: 3.7 K/UL — LOW (ref 3.8–10.5)
WBC # FLD AUTO: 4.2 K/UL — SIGNIFICANT CHANGE UP (ref 3.8–10.5)

## 2017-04-28 PROCEDURE — 99223 1ST HOSP IP/OBS HIGH 75: CPT | Mod: GC

## 2017-04-28 PROCEDURE — 71010: CPT | Mod: 26

## 2017-04-28 PROCEDURE — 99285 EMERGENCY DEPT VISIT HI MDM: CPT | Mod: GC

## 2017-04-28 RX ORDER — MINOCYCLINE HYDROCHLORIDE 45 MG/1
100 TABLET, EXTENDED RELEASE ORAL
Qty: 0 | Refills: 0 | Status: DISCONTINUED | OUTPATIENT
Start: 2017-04-28 | End: 2017-05-01

## 2017-04-28 RX ORDER — PREGABALIN 225 MG/1
1000 CAPSULE ORAL DAILY
Qty: 0 | Refills: 0 | Status: DISCONTINUED | OUTPATIENT
Start: 2017-04-28 | End: 2017-05-01

## 2017-04-28 RX ORDER — PREDNISONE 50 MG/1
50 TABLET ORAL
Qty: 60 | Refills: 0 | Status: DISCONTINUED | COMMUNITY
Start: 2017-01-03 | End: 2017-04-28

## 2017-04-28 RX ORDER — SODIUM CHLORIDE 9 MG/ML
1000 INJECTION INTRAMUSCULAR; INTRAVENOUS; SUBCUTANEOUS ONCE
Qty: 0 | Refills: 0 | Status: COMPLETED | OUTPATIENT
Start: 2017-04-28 | End: 2017-04-28

## 2017-04-28 RX ORDER — ACETAMINOPHEN 500 MG
650 TABLET ORAL EVERY 6 HOURS
Qty: 0 | Refills: 0 | Status: DISCONTINUED | OUTPATIENT
Start: 2017-04-28 | End: 2017-05-01

## 2017-04-28 RX ORDER — CIPROFLOXACIN LACTATE 400MG/40ML
400 VIAL (ML) INTRAVENOUS ONCE
Qty: 0 | Refills: 0 | Status: COMPLETED | OUTPATIENT
Start: 2017-04-28 | End: 2017-04-28

## 2017-04-28 RX ORDER — ASPIRIN/CALCIUM CARB/MAGNESIUM 324 MG
81 TABLET ORAL DAILY
Qty: 0 | Refills: 0 | Status: DISCONTINUED | OUTPATIENT
Start: 2017-04-28 | End: 2017-05-01

## 2017-04-28 RX ORDER — VANCOMYCIN HCL 1 G
1000 VIAL (EA) INTRAVENOUS ONCE
Qty: 0 | Refills: 0 | Status: COMPLETED | OUTPATIENT
Start: 2017-04-28 | End: 2017-04-28

## 2017-04-28 RX ORDER — ACETAMINOPHEN 500 MG
650 TABLET ORAL ONCE
Qty: 0 | Refills: 0 | Status: COMPLETED | OUTPATIENT
Start: 2017-04-28 | End: 2017-04-28

## 2017-04-28 RX ORDER — PIPERACILLIN AND TAZOBACTAM 4; .5 G/20ML; G/20ML
3.38 INJECTION, POWDER, LYOPHILIZED, FOR SOLUTION INTRAVENOUS ONCE
Qty: 0 | Refills: 0 | Status: COMPLETED | OUTPATIENT
Start: 2017-04-28 | End: 2017-04-28

## 2017-04-28 RX ORDER — IPRATROPIUM/ALBUTEROL SULFATE 18-103MCG
3 AEROSOL WITH ADAPTER (GRAM) INHALATION ONCE
Qty: 0 | Refills: 0 | Status: COMPLETED | OUTPATIENT
Start: 2017-04-28 | End: 2017-04-28

## 2017-04-28 RX ORDER — LACTOBACILLUS ACIDOPHILUS 100MM CELL
1 CAPSULE ORAL DAILY
Qty: 0 | Refills: 0 | Status: DISCONTINUED | OUTPATIENT
Start: 2017-04-28 | End: 2017-05-01

## 2017-04-28 RX ORDER — APIXABAN 2.5 MG/1
5 TABLET, FILM COATED ORAL
Qty: 0 | Refills: 0 | Status: DISCONTINUED | OUTPATIENT
Start: 2017-04-28 | End: 2017-05-01

## 2017-04-28 RX ADMIN — Medication 3 MILLILITER(S): at 18:38

## 2017-04-28 RX ADMIN — Medication 250 MILLIGRAM(S): at 21:18

## 2017-04-28 RX ADMIN — Medication 200 MILLIGRAM(S): at 18:42

## 2017-04-28 RX ADMIN — Medication 650 MILLIGRAM(S): at 18:44

## 2017-04-28 RX ADMIN — SODIUM CHLORIDE 1000 MILLILITER(S): 9 INJECTION INTRAMUSCULAR; INTRAVENOUS; SUBCUTANEOUS at 18:38

## 2017-04-28 RX ADMIN — PIPERACILLIN AND TAZOBACTAM 200 GRAM(S): 4; .5 INJECTION, POWDER, LYOPHILIZED, FOR SOLUTION INTRAVENOUS at 20:12

## 2017-04-28 NOTE — H&P ADULT. - PROBLEM SELECTOR PLAN 4
-2/2 chemo, down trending  -no signs of bleeding  -continue to monitor -2/2 chemo, currently stable  -no signs of bleeding  -tx to hgb > 7  -continue to monitor

## 2017-04-28 NOTE — H&P ADULT. - HISTORY OF PRESENT ILLNESS
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 Pt started having sore throat and runny nose 2 days ago. the following day woke up  SOB, cough. His cough is productive of copious whitish yellow sputum.  He called [facility] and he was prescribed a course of Zpack (took one pill so far). He called [facility] again as he did not get better and he was subsequently told to come to the hospital. Denies any fever, chills, N/V, diarrhea, constipation, or dysuria. No recent sick contacts or changes in medications.    ED course: T 99.5, HR 87, /60, RR22, SpO2 95 RA. Initial labs show WBC 4.2, plt 72, CMP wnl, RVP positive for entero/rhino virus. CXR shows b/l opacities unchanged from previous study. EKG show NSR, HR 88, QTc 452, no acute ST changes.  Pt is given 1 L IVF, cipro, vancomycin, zosyn, Duoneb and admitted to medicine.

## 2017-04-28 NOTE — H&P ADULT. - ATTENDING COMMENTS
agree with excellent PGY-2 note with the following additions:  This is a 76 year old gentleman with history of high grade large B cell lymphoma on chemotherapy last 4/21, prostate CA (dx 2008, no treatment), colon CA with colectomy 1995 and adjuvant chemotherapy presumed to be in remission, BCC, CAD, peripheral vascular disease with chronic osteomyelitis of the L heel c/b MRSA spinal abscess s/p laminectomy currently on suppressive minocycline, COPD with history of heavy tobacco use, and most recent admission for new onset AFib with RVR now on CCB and eliquis presenting with cough, sore throat, SOB x days. He was prescribed Zpac by [facility], took one dose without improvement.  Here, afebrile, HR 80s, /60, satting well on RA. Exam with shotty submandibular LAD, no tonsilar exudate, diffuse rhonchi bilaterally. Labs notable for WBC 4.2, normocytic anemia to 8.6, Plt 72 (all lines have fluctuated over the alst 3 months with chemo). INR 1.42, procalcitonin 0.13.  CXR clear.  Suspect viral URI, no evidence of bacterial superinfection.  Continue supportive care with duonebs q6h, robitussin PRN.  Continue azithromicin for antiinflammatory effect. Continue CCB, eliquis.  Check B12, folate, suspect that INR elevation is 2/2 eliquis, though consider some dietary component as well.

## 2017-04-28 NOTE — ED PROVIDER NOTE - PHYSICAL EXAMINATION
PE: CONSTITUTIONAL: Nontoxic, in mild apparent distress. ENMT: Airway patent, nasal mucosa clear, mouth with normal mucosa. HEAD: NCAT EYES: PERRL, EOMI bilaterally CARDIAC: RRR, no m/r/g, no pedal edema RESPIRATORY: Mild-moderate bibasilar rales GI: Abdomen non-distended, non-tender MSK: Spine appears normal, range of motion is not limited, no muscle/joint tenderness NEURO: CNII-XII grossly intact, 5/5 strength, full sensation all extremities, gait intact SKIN: Skin tone normal in color, warm and dry. No evidence of rash.

## 2017-04-28 NOTE — H&P ADULT. - EXTREMITIES COMMENTS
right 1st toe healing ulcer. dry. no discharge. b/l LE discoloration from venous stasis (iron deposition). left foot black. No edema, erythema.

## 2017-04-28 NOTE — ED ADULT NURSE NOTE - BREATH SOUNDS, MLM
Rhonchi/Wheezes
Abdomen soft, non-tender and non-distended without organomegaly or masses. Normal bowel sounds.

## 2017-04-28 NOTE — ED PROVIDER NOTE - NS ED ROS FT
ROS: GENERAL: no fevers, no chills HEENT: no epistaxis, no eye pain, no ear pain, +rhinorrhea, + throat pain CARDIAC: no chest pain, + shortness of breath PULM: + cough, + shortness of breath GI: no nausea, no vomiting, no diarrhea, no abdominal pain, no hematemesis, no bright red blood per rectum : no dysuria, no hematuria EXTREMITIES: no arm pain, no leg pain, no back pain SKIN: no purpura, no petechiae NEURO: no headache, no neck pain, no loss of strength/sensation HEME: no easy bruising, no easy bleeding

## 2017-04-28 NOTE — H&P ADULT. - PROBLEM SELECTOR PLAN 5
-currently in NSR  -c/w Eliquis  -pt follows up with Dr. Chon Wong -2/2 chemo, down trending  -no signs of bleeding  -continue to monitor

## 2017-04-28 NOTE — ED ADULT NURSE NOTE - OBJECTIVE STATEMENT
Pt known case of NHL on chemo last chemo 1 week ago now C/O chest congestion & SOB  pt  has productive cough  with clear expectoration With low grade  fever Jeannie CP N/V/D  pt not in distress

## 2017-04-28 NOTE — H&P ADULT. - OTHER CARE PROVIDERS
Dr. Grabiel Swenson (Hematology, [facility]), Dr. Chon Wong (Cardiology)

## 2017-04-28 NOTE — H&P ADULT. - ASSESSMENT
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

## 2017-04-28 NOTE — H&P ADULT. - PROBLEM SELECTOR PLAN 1
-likely viral etiology  -s/p cipro, vanc, zosyn  -given increased SOB, sputum production in the setting of COPD, and recent chemo treatment will cover with azithromycin, ceftriaxone  -f/u Bcx, urine cx, procalcitonin  -duoneb q6hr prn for SOB -RVP positive for entero/rhino virus  -s/p cipro, vanc, zosyn  -given increased SOB, sputum production in the setting of COPD, and recent chemo treatment will cover with azithromycin  -f/u Bcx, urine cx, procalcitonin  -duoneb q6hr prn for SOB

## 2017-04-28 NOTE — ED PROVIDER NOTE - OBJECTIVE STATEMENT
77y Male PMH Non Hodgkin's lymphoma on chemotherapy (most recently one week ago), COPD, CAD complaining of shortness of breath and cough x 2 days. Associated with chest congestion. Initially started as sore throat and rhinorrhea 2 days ago. Initially took Zpak (one dose so far), not improving. Dyspnea and cough worsening. Denies fevers, chills, nausea, vomiting, diarrhea, constipation, chest pain.     Chin: Dr. Grabiel Swenson

## 2017-04-28 NOTE — H&P ADULT. - PROBLEM SELECTOR PLAN 3
-2/2 chemo, currently stable  -no signs of bleeding  -tx to hgb > 7  -continue to monitor -completed 3 cycle of treatment with Vcr/rituxan/cytoxan/etoposide/VCR  [place] [facility] [place] notify heme in a.m

## 2017-04-28 NOTE — H&P ADULT. - PROBLEM SELECTOR PLAN 2
-completed 3 cycle of treatment with Vcr/rituxan/cytoxan/etoposide/VCR  [place] [facility] [place]  [place]will notify heme in a.m -likely viral etiology  -s/p cipro, vanc, zosyn  -given increased SOB, sputum production in the setting of COPD, and recent chemo treatment will cover with azithromycin, ceftriaxone  -f/u Bcx, urine cx, procalcitonin  -duoneb q6hr prn for SOB -completed 3 cycle of treatment with Vcr/rituxan/cytoxan/etoposide/VCR  -[facility]  -will notify heme in a.m

## 2017-04-28 NOTE — ED PROVIDER NOTE - MEDICAL DECISION MAKING DETAILS
77y Male PMH Non Hodgkin's lymphoma on chemotherapy (most recently one week ago), COPD complaining of shortness of breath and cough refractory to oupt antibiotics, will obtain labs, CXR, EKG, assess for PNA and/or viral infection causing COPD exacerbation, likely admit 77y Male PMH Non Hodgkin's lymphoma on chemotherapy (most recently one week ago), COPD complaining of shortness of breath and cough refractory to oupt antibiotics, will obtain labs, CXR, EKG, assess for PNA and/or viral infection causing COPD exacerbation, likely admit  Attending Statement: Agree with the above.  PNA, f/c, resp distress.  Improved after nebs.  Does not require bipap at this time.  healthcare-associated pneumonia (HCAP) abx coverage, basic labs, pan cx, fluids.  Does not meet sepsis criteria.  Admit.  --BMM

## 2017-04-29 DIAGNOSIS — J06.9 ACUTE UPPER RESPIRATORY INFECTION, UNSPECIFIED: ICD-10-CM

## 2017-04-29 LAB
ANION GAP SERPL CALC-SCNC: 11 MMOL/L — SIGNIFICANT CHANGE UP (ref 5–17)
BASOPHILS # BLD AUTO: 0 K/UL — SIGNIFICANT CHANGE UP (ref 0–0.2)
BASOPHILS NFR BLD AUTO: 0 % — SIGNIFICANT CHANGE UP (ref 0–2)
BUN SERPL-MCNC: 10 MG/DL — SIGNIFICANT CHANGE UP (ref 7–23)
CALCIUM SERPL-MCNC: 8.4 MG/DL — SIGNIFICANT CHANGE UP (ref 8.4–10.5)
CHLORIDE SERPL-SCNC: 103 MMOL/L — SIGNIFICANT CHANGE UP (ref 96–108)
CO2 SERPL-SCNC: 27 MMOL/L — SIGNIFICANT CHANGE UP (ref 22–31)
CREAT SERPL-MCNC: 0.52 MG/DL — SIGNIFICANT CHANGE UP (ref 0.5–1.3)
EOSINOPHIL # BLD AUTO: 0.07 K/UL — SIGNIFICANT CHANGE UP (ref 0–0.5)
EOSINOPHIL NFR BLD AUTO: 2.6 % — SIGNIFICANT CHANGE UP (ref 0–6)
FOLATE SERPL-MCNC: 15.3 NG/ML — SIGNIFICANT CHANGE UP (ref 4.8–24.2)
GLUCOSE SERPL-MCNC: 110 MG/DL — HIGH (ref 70–99)
HCT VFR BLD CALC: 23.3 % — LOW (ref 39–50)
HGB BLD-MCNC: 7.4 G/DL — LOW (ref 13–17)
LYMPHOCYTES # BLD AUTO: 0.25 K/UL — LOW (ref 1–3.3)
LYMPHOCYTES # BLD AUTO: 8.7 % — LOW (ref 13–44)
MAGNESIUM SERPL-MCNC: 1.9 MG/DL — SIGNIFICANT CHANGE UP (ref 1.6–2.6)
MCHC RBC-ENTMCNC: 29.1 PG — SIGNIFICANT CHANGE UP (ref 27–34)
MCHC RBC-ENTMCNC: 31.8 GM/DL — LOW (ref 32–36)
MCV RBC AUTO: 91.7 FL — SIGNIFICANT CHANGE UP (ref 80–100)
MONOCYTES # BLD AUTO: 0.37 K/UL — SIGNIFICANT CHANGE UP (ref 0–0.9)
MONOCYTES NFR BLD AUTO: 13 % — SIGNIFICANT CHANGE UP (ref 2–14)
NEUTROPHILS # BLD AUTO: 2.15 K/UL — SIGNIFICANT CHANGE UP (ref 1.8–7.4)
NEUTROPHILS NFR BLD AUTO: 61.7 % — SIGNIFICANT CHANGE UP (ref 43–77)
PHOSPHATE SERPL-MCNC: 2.9 MG/DL — SIGNIFICANT CHANGE UP (ref 2.5–4.5)
PLATELET # BLD AUTO: 81 K/UL — LOW (ref 150–400)
POTASSIUM SERPL-MCNC: 3.6 MMOL/L — SIGNIFICANT CHANGE UP (ref 3.5–5.3)
POTASSIUM SERPL-SCNC: 3.6 MMOL/L — SIGNIFICANT CHANGE UP (ref 3.5–5.3)
RBC # BLD: 2.54 M/UL — LOW (ref 4.2–5.8)
RBC # FLD: 16.7 % — HIGH (ref 10.3–14.5)
SODIUM SERPL-SCNC: 141 MMOL/L — SIGNIFICANT CHANGE UP (ref 135–145)
VIT B12 SERPL-MCNC: >2000 PG/ML — HIGH (ref 243–894)
WBC # BLD: 2.84 K/UL — LOW (ref 3.8–10.5)
WBC # FLD AUTO: 2.84 K/UL — LOW (ref 3.8–10.5)

## 2017-04-29 PROCEDURE — 99233 SBSQ HOSP IP/OBS HIGH 50: CPT

## 2017-04-29 PROCEDURE — 99222 1ST HOSP IP/OBS MODERATE 55: CPT

## 2017-04-29 PROCEDURE — 99223 1ST HOSP IP/OBS HIGH 75: CPT

## 2017-04-29 RX ORDER — IPRATROPIUM/ALBUTEROL SULFATE 18-103MCG
3 AEROSOL WITH ADAPTER (GRAM) INHALATION EVERY 8 HOURS
Qty: 0 | Refills: 0 | Status: DISCONTINUED | OUTPATIENT
Start: 2017-04-29 | End: 2017-05-01

## 2017-04-29 RX ORDER — SODIUM CHLORIDE 9 MG/ML
1000 INJECTION INTRAMUSCULAR; INTRAVENOUS; SUBCUTANEOUS
Qty: 0 | Refills: 0 | Status: DISCONTINUED | OUTPATIENT
Start: 2017-04-29 | End: 2017-05-01

## 2017-04-29 RX ORDER — IPRATROPIUM/ALBUTEROL SULFATE 18-103MCG
3 AEROSOL WITH ADAPTER (GRAM) INHALATION EVERY 6 HOURS
Qty: 0 | Refills: 0 | Status: DISCONTINUED | OUTPATIENT
Start: 2017-04-29 | End: 2017-04-29

## 2017-04-29 RX ORDER — AZITHROMYCIN 500 MG/1
500 TABLET, FILM COATED ORAL EVERY 24 HOURS
Qty: 0 | Refills: 0 | Status: DISCONTINUED | OUTPATIENT
Start: 2017-04-29 | End: 2017-04-29

## 2017-04-29 RX ADMIN — AZITHROMYCIN 250 MILLIGRAM(S): 500 TABLET, FILM COATED ORAL at 11:10

## 2017-04-29 RX ADMIN — Medication 1 TABLET(S): at 11:10

## 2017-04-29 RX ADMIN — Medication 81 MILLIGRAM(S): at 11:10

## 2017-04-29 RX ADMIN — Medication 3 MILLILITER(S): at 22:33

## 2017-04-29 RX ADMIN — MINOCYCLINE HYDROCHLORIDE 100 MILLIGRAM(S): 45 TABLET, EXTENDED RELEASE ORAL at 05:47

## 2017-04-29 RX ADMIN — Medication 3 MILLILITER(S): at 14:38

## 2017-04-29 RX ADMIN — MINOCYCLINE HYDROCHLORIDE 100 MILLIGRAM(S): 45 TABLET, EXTENDED RELEASE ORAL at 17:18

## 2017-04-29 RX ADMIN — PREGABALIN 1000 MICROGRAM(S): 225 CAPSULE ORAL at 11:17

## 2017-04-29 RX ADMIN — APIXABAN 5 MILLIGRAM(S): 2.5 TABLET, FILM COATED ORAL at 05:47

## 2017-04-29 RX ADMIN — APIXABAN 5 MILLIGRAM(S): 2.5 TABLET, FILM COATED ORAL at 17:18

## 2017-04-29 RX ADMIN — Medication 3 MILLILITER(S): at 11:10

## 2017-04-30 LAB
ANION GAP SERPL CALC-SCNC: 13 MMOL/L — SIGNIFICANT CHANGE UP (ref 5–17)
BASOPHILS # BLD AUTO: 0.02 K/UL — SIGNIFICANT CHANGE UP (ref 0–0.2)
BASOPHILS NFR BLD AUTO: 0.7 % — SIGNIFICANT CHANGE UP (ref 0–2)
BUN SERPL-MCNC: 8 MG/DL — SIGNIFICANT CHANGE UP (ref 7–23)
CALCIUM SERPL-MCNC: 8.4 MG/DL — SIGNIFICANT CHANGE UP (ref 8.4–10.5)
CHLORIDE SERPL-SCNC: 102 MMOL/L — SIGNIFICANT CHANGE UP (ref 96–108)
CO2 SERPL-SCNC: 26 MMOL/L — SIGNIFICANT CHANGE UP (ref 22–31)
CREAT SERPL-MCNC: 0.51 MG/DL — SIGNIFICANT CHANGE UP (ref 0.5–1.3)
EOSINOPHIL # BLD AUTO: 0.05 K/UL — SIGNIFICANT CHANGE UP (ref 0–0.5)
EOSINOPHIL NFR BLD AUTO: 1.8 % — SIGNIFICANT CHANGE UP (ref 0–6)
GLUCOSE SERPL-MCNC: 100 MG/DL — HIGH (ref 70–99)
HCT VFR BLD CALC: 24 % — LOW (ref 39–50)
HGB BLD-MCNC: 7.4 G/DL — LOW (ref 13–17)
IMM GRANULOCYTES NFR BLD AUTO: 0.4 % — SIGNIFICANT CHANGE UP (ref 0–1.5)
LYMPHOCYTES # BLD AUTO: 0.37 K/UL — LOW (ref 1–3.3)
LYMPHOCYTES # BLD AUTO: 13.1 % — SIGNIFICANT CHANGE UP (ref 13–44)
MCHC RBC-ENTMCNC: 28.8 PG — SIGNIFICANT CHANGE UP (ref 27–34)
MCHC RBC-ENTMCNC: 30.8 GM/DL — LOW (ref 32–36)
MCV RBC AUTO: 93.4 FL — SIGNIFICANT CHANGE UP (ref 80–100)
MONOCYTES # BLD AUTO: 0.61 K/UL — SIGNIFICANT CHANGE UP (ref 0–0.9)
MONOCYTES NFR BLD AUTO: 21.6 % — HIGH (ref 2–14)
NEUTROPHILS # BLD AUTO: 1.77 K/UL — LOW (ref 1.8–7.4)
NEUTROPHILS NFR BLD AUTO: 62.4 % — SIGNIFICANT CHANGE UP (ref 43–77)
PLATELET # BLD AUTO: 83 K/UL — LOW (ref 150–400)
POTASSIUM SERPL-MCNC: 4 MMOL/L — SIGNIFICANT CHANGE UP (ref 3.5–5.3)
POTASSIUM SERPL-SCNC: 4 MMOL/L — SIGNIFICANT CHANGE UP (ref 3.5–5.3)
RBC # BLD: 2.57 M/UL — LOW (ref 4.2–5.8)
RBC # FLD: 16.6 % — HIGH (ref 10.3–14.5)
SODIUM SERPL-SCNC: 141 MMOL/L — SIGNIFICANT CHANGE UP (ref 135–145)
WBC # BLD: 2.83 K/UL — LOW (ref 3.8–10.5)
WBC # FLD AUTO: 2.83 K/UL — LOW (ref 3.8–10.5)

## 2017-04-30 PROCEDURE — 99233 SBSQ HOSP IP/OBS HIGH 50: CPT | Mod: GC

## 2017-04-30 PROCEDURE — 99232 SBSQ HOSP IP/OBS MODERATE 35: CPT

## 2017-04-30 RX ADMIN — Medication 3 MILLILITER(S): at 13:44

## 2017-04-30 RX ADMIN — Medication 1 TABLET(S): at 11:18

## 2017-04-30 RX ADMIN — Medication 3 MILLILITER(S): at 21:28

## 2017-04-30 RX ADMIN — SODIUM CHLORIDE 75 MILLILITER(S): 9 INJECTION INTRAMUSCULAR; INTRAVENOUS; SUBCUTANEOUS at 06:04

## 2017-04-30 RX ADMIN — Medication 3 MILLILITER(S): at 06:04

## 2017-04-30 RX ADMIN — Medication 81 MILLIGRAM(S): at 11:18

## 2017-04-30 RX ADMIN — PREGABALIN 1000 MICROGRAM(S): 225 CAPSULE ORAL at 11:18

## 2017-04-30 RX ADMIN — MINOCYCLINE HYDROCHLORIDE 100 MILLIGRAM(S): 45 TABLET, EXTENDED RELEASE ORAL at 06:04

## 2017-04-30 RX ADMIN — MINOCYCLINE HYDROCHLORIDE 100 MILLIGRAM(S): 45 TABLET, EXTENDED RELEASE ORAL at 17:07

## 2017-04-30 RX ADMIN — APIXABAN 5 MILLIGRAM(S): 2.5 TABLET, FILM COATED ORAL at 17:07

## 2017-04-30 RX ADMIN — APIXABAN 5 MILLIGRAM(S): 2.5 TABLET, FILM COATED ORAL at 06:04

## 2017-05-01 ENCOUNTER — TRANSCRIPTION ENCOUNTER (OUTPATIENT)
Age: 77
End: 2017-05-01

## 2017-05-01 VITALS
TEMPERATURE: 98 F | RESPIRATION RATE: 20 BRPM | DIASTOLIC BLOOD PRESSURE: 66 MMHG | OXYGEN SATURATION: 96 % | SYSTOLIC BLOOD PRESSURE: 127 MMHG | HEART RATE: 77 BPM

## 2017-05-01 PROCEDURE — 84295 ASSAY OF SERUM SODIUM: CPT

## 2017-05-01 PROCEDURE — 97161 PT EVAL LOW COMPLEX 20 MIN: CPT

## 2017-05-01 PROCEDURE — 82803 BLOOD GASES ANY COMBINATION: CPT

## 2017-05-01 PROCEDURE — 84100 ASSAY OF PHOSPHORUS: CPT

## 2017-05-01 PROCEDURE — 85014 HEMATOCRIT: CPT

## 2017-05-01 PROCEDURE — 82607 VITAMIN B-12: CPT

## 2017-05-01 PROCEDURE — 85730 THROMBOPLASTIN TIME PARTIAL: CPT

## 2017-05-01 PROCEDURE — 99285 EMERGENCY DEPT VISIT HI MDM: CPT | Mod: 25

## 2017-05-01 PROCEDURE — 82947 ASSAY GLUCOSE BLOOD QUANT: CPT

## 2017-05-01 PROCEDURE — 85027 COMPLETE CBC AUTOMATED: CPT

## 2017-05-01 PROCEDURE — 80048 BASIC METABOLIC PNL TOTAL CA: CPT

## 2017-05-01 PROCEDURE — 82330 ASSAY OF CALCIUM: CPT

## 2017-05-01 PROCEDURE — 80053 COMPREHEN METABOLIC PANEL: CPT

## 2017-05-01 PROCEDURE — 87633 RESP VIRUS 12-25 TARGETS: CPT

## 2017-05-01 PROCEDURE — 94640 AIRWAY INHALATION TREATMENT: CPT

## 2017-05-01 PROCEDURE — 87486 CHLMYD PNEUM DNA AMP PROBE: CPT

## 2017-05-01 PROCEDURE — 84145 PROCALCITONIN (PCT): CPT

## 2017-05-01 PROCEDURE — 83735 ASSAY OF MAGNESIUM: CPT

## 2017-05-01 PROCEDURE — 84132 ASSAY OF SERUM POTASSIUM: CPT

## 2017-05-01 PROCEDURE — 82435 ASSAY OF BLOOD CHLORIDE: CPT

## 2017-05-01 PROCEDURE — 87581 M.PNEUMON DNA AMP PROBE: CPT

## 2017-05-01 PROCEDURE — 87798 DETECT AGENT NOS DNA AMP: CPT

## 2017-05-01 PROCEDURE — 71045 X-RAY EXAM CHEST 1 VIEW: CPT

## 2017-05-01 PROCEDURE — 83605 ASSAY OF LACTIC ACID: CPT

## 2017-05-01 PROCEDURE — 99239 HOSP IP/OBS DSCHRG MGMT >30: CPT

## 2017-05-01 PROCEDURE — 96374 THER/PROPH/DIAG INJ IV PUSH: CPT

## 2017-05-01 PROCEDURE — 82746 ASSAY OF FOLIC ACID SERUM: CPT

## 2017-05-01 PROCEDURE — 85610 PROTHROMBIN TIME: CPT

## 2017-05-01 PROCEDURE — 87040 BLOOD CULTURE FOR BACTERIA: CPT

## 2017-05-01 RX ORDER — APIXABAN 2.5 MG/1
1 TABLET, FILM COATED ORAL
Qty: 0 | Refills: 0 | COMMUNITY

## 2017-05-01 RX ORDER — APIXABAN 2.5 MG/1
1 TABLET, FILM COATED ORAL
Qty: 0 | Refills: 0 | COMMUNITY
Start: 2017-05-01

## 2017-05-01 RX ORDER — CIPROFLOXACIN LACTATE 400MG/40ML
1 VIAL (ML) INTRAVENOUS
Qty: 4 | Refills: 0 | OUTPATIENT
Start: 2017-05-01 | End: 2017-05-05

## 2017-05-01 RX ADMIN — Medication 3 MILLILITER(S): at 13:58

## 2017-05-01 RX ADMIN — Medication 1 TABLET(S): at 12:17

## 2017-05-01 RX ADMIN — APIXABAN 5 MILLIGRAM(S): 2.5 TABLET, FILM COATED ORAL at 05:44

## 2017-05-01 RX ADMIN — MINOCYCLINE HYDROCHLORIDE 100 MILLIGRAM(S): 45 TABLET, EXTENDED RELEASE ORAL at 05:44

## 2017-05-01 RX ADMIN — PREGABALIN 1000 MICROGRAM(S): 225 CAPSULE ORAL at 13:00

## 2017-05-01 RX ADMIN — Medication 81 MILLIGRAM(S): at 12:17

## 2017-05-01 RX ADMIN — Medication 3 MILLILITER(S): at 05:43

## 2017-05-01 NOTE — DISCHARGE NOTE ADULT - HOSPITAL COURSE
see MD's note 76 yo with DLBCL, last chemo 4/21 aw cough, dyspnea.     Started on iv levaquin to treat presumed gram negative and gram positive bacterial bronchitis given immunocompromised status.     Borderline hypoxic- 89% on admission. Hypoxia resolved with chest PT and incentive spirometry.     Pancytopenia monitored.    Clinically improved, able to ambulate. Discharged on po abx.     Diagnoses: Acute bacterial bronchitis; Diffuse large B cell lymphoma; Hypoxia; Pancytopenia due to chemotherapy; Dehydration; Paroxysmal atrial fibrillation 78 yo with DLBCL, last chemo 4/21 aw cough, dyspnea.     Started on iv levaquin to treat presumed gram negative and gram positive bacterial bronchitis given immunocompromised status.     Borderline hypoxic- 89% on admission. Hypoxia resolved with chest PT and incentive spirometry.     Pancytopenia monitored.    Clinically improved, able to ambulate. Discharged on po abx.     Pt is also on long term suppressive abx for remote h/o osteo.     Diagnoses: Acute bacterial bronchitis; Diffuse large B cell lymphoma; Hypoxia; Pancytopenia due to chemotherapy; Dehydration; Paroxysmal atrial fibrillation

## 2017-05-01 NOTE — DISCHARGE NOTE ADULT - PATIENT PORTAL LINK FT
“You can access the [facility] Patient Portal, offered by [facility], by registering with the following website: [email or web address]”

## 2017-05-01 NOTE — PHYSICAL THERAPY INITIAL EVALUATION ADULT - GENERAL OBSERVATIONS, REHAB EVAL
Pt received supine in bed in NAD, VSS, + UE IV lock; trophic changes noted on skin of wiley LEs from below knees t feet.

## 2017-05-01 NOTE — DISCHARGE NOTE ADULT - MEDICATION SUMMARY - MEDICATIONS TO STOP TAKING
I will STOP taking the medications listed below when I get home from the hospital:    azithromycin 250 mg oral tablet  --  2 tablet(s) by mouth once a day on day 1 then 1 tablet(s) by mouth once a day for days 2-5

## 2017-05-01 NOTE — DISCHARGE NOTE ADULT - PLAN OF CARE
IMPROVED Pneumonia is a lung infection that can cause a fever, cough, and trouble breathing.  Continue all antibiotics as ordered until complete.  Nutrition is important, eat small frequent meals.  Get lots of rest and drink fluids.  Call your health care provider upon arrival home from hospital and make a follow up appointment for one week.  If your cough worsens, you develop fever greater than 101', you have shaking chills, a fast heartbeat, trouble breathing and/or feel your are breathing much faster than usual, call your healthcare provider.  Make sure you wash your hands frequently. Atrial fibrillation is the most common heart rhythm problem & has the risk of stroke & heart attack  It helps if you control your blood pressure, not drink more than 1-2 alcohol drinks per day, cut down on caffeine, getting treatment for over active thyroid gland, & getting exercise  Call your doctor if you feel your heart racing or beating unusually, chest tightness or pain, lightheaded, faint, shortness of breath especially with exercise  It is important to take your heart medication as prescribed  You may be on anticoagulation which is very important to take as directed - please follow up at the [facility] with Dr. Swenson, please call for an appointment Monitor cbc at the [facility] Continue all antibiotics as ordered until complete.  Nutrition is important, eat small frequent meals.  Get lots of rest and drink fluids.  Call your health care provider upon arrival home from hospital and make a follow up appointment for one week.  If your cough worsens, you develop fever greater than 101', you have shaking chills, a fast heartbeat, trouble breathing and/or feel your are breathing much faster than usual, call your healthcare provider.  Make sure you wash your hands frequently.

## 2017-05-01 NOTE — DISCHARGE NOTE ADULT - CARE PROVIDER_API CALL
Grabiel Swenson), Hematology; Internal Medicine; Medical Oncology  [address] [place] [address]  Phone: [phone]  Fax: [phone]

## 2017-05-01 NOTE — PHYSICAL THERAPY INITIAL EVALUATION ADULT - PERTINENT HX OF CURRENT PROBLEM, REHAB EVAL
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

## 2017-05-01 NOTE — DISCHARGE NOTE ADULT - CARE PLAN
Principal Discharge DX:	Pneumonia  Goal:	IMPROVED  Instructions for follow-up, activity and diet:	Pneumonia is a lung infection that can cause a fever, cough, and trouble breathing.  Continue all antibiotics as ordered until complete.  Nutrition is important, eat small frequent meals.  Get lots of rest and drink fluids.  Call your health care provider upon arrival home from hospital and make a follow up appointment for one week.  If your cough worsens, you develop fever greater than 101', you have shaking chills, a fast heartbeat, trouble breathing and/or feel your are breathing much faster than usual, call your healthcare provider.  Make sure you wash your hands frequently.  Secondary Diagnosis:	Atrial fibrillation, unspecified type  Instructions for follow-up, activity and diet:	Atrial fibrillation is the most common heart rhythm problem & has the risk of stroke & heart attack  It helps if you control your blood pressure, not drink more than 1-2 alcohol drinks per day, cut down on caffeine, getting treatment for over active thyroid gland, & getting exercise  Call your doctor if you feel your heart racing or beating unusually, chest tightness or pain, lightheaded, faint, shortness of breath especially with exercise  It is important to take your heart medication as prescribed  You may be on anticoagulation which is very important to take as directed -  Secondary Diagnosis:	Diffuse large B cell lymphoma  Instructions for follow-up, activity and diet:	please follow up at the [facility] with Dr. Swenson, please call for an appointment  Secondary Diagnosis:	Anemia  Instructions for follow-up, activity and diet:	Monitor cbc at the [facility] Principal Discharge DX:	Pneumonia  Goal:	IMPROVED  Instructions for follow-up, activity and diet:	Pneumonia is a lung infection that can cause a fever, cough, and trouble breathing.  Continue all antibiotics as ordered until complete.  Nutrition is important, eat small frequent meals.  Get lots of rest and drink fluids.  Call your health care provider upon arrival home from hospital and make a follow up appointment for one week.  If your cough worsens, you develop fever greater than 101', you have shaking chills, a fast heartbeat, trouble breathing and/or feel your are breathing much faster than usual, call your healthcare provider.  Make sure you wash your hands frequently.  Secondary Diagnosis:	Atrial fibrillation, unspecified type  Instructions for follow-up, activity and diet:	Atrial fibrillation is the most common heart rhythm problem & has the risk of stroke & heart attack  It helps if you control your blood pressure, not drink more than 1-2 alcohol drinks per day, cut down on caffeine, getting treatment for over active thyroid gland, & getting exercise  Call your doctor if you feel your heart racing or beating unusually, chest tightness or pain, lightheaded, faint, shortness of breath especially with exercise  It is important to take your heart medication as prescribed  You may be on anticoagulation which is very important to take as directed -  Secondary Diagnosis:	Diffuse large B cell lymphoma  Instructions for follow-up, activity and diet:	please follow up at the [facility] with Dr. Swenson, please call for an appointment  Secondary Diagnosis:	Anemia  Instructions for follow-up, activity and diet:	Monitor cbc at the [facility] Principal Discharge DX:	Pneumonia  Goal:	IMPROVED  Instructions for follow-up, activity and diet:	Pneumonia is a lung infection that can cause a fever, cough, and trouble breathing.  Continue all antibiotics as ordered until complete.  Nutrition is important, eat small frequent meals.  Get lots of rest and drink fluids.  Call your health care provider upon arrival home from hospital and make a follow up appointment for one week.  If your cough worsens, you develop fever greater than 101', you have shaking chills, a fast heartbeat, trouble breathing and/or feel your are breathing much faster than usual, call your healthcare provider.  Make sure you wash your hands frequently.  Secondary Diagnosis:	Atrial fibrillation, unspecified type  Instructions for follow-up, activity and diet:	Atrial fibrillation is the most common heart rhythm problem & has the risk of stroke & heart attack  It helps if you control your blood pressure, not drink more than 1-2 alcohol drinks per day, cut down on caffeine, getting treatment for over active thyroid gland, & getting exercise  Call your doctor if you feel your heart racing or beating unusually, chest tightness or pain, lightheaded, faint, shortness of breath especially with exercise  It is important to take your heart medication as prescribed  You may be on anticoagulation which is very important to take as directed -  Secondary Diagnosis:	Diffuse large B cell lymphoma  Instructions for follow-up, activity and diet:	please follow up at the [facility] with Dr. Swenson, please call for an appointment  Secondary Diagnosis:	Anemia  Instructions for follow-up, activity and diet:	Monitor cbc at the [facility] Principal Discharge DX:	Pneumonia  Goal:	IMPROVED  Instructions for follow-up, activity and diet:	Pneumonia is a lung infection that can cause a fever, cough, and trouble breathing.  Continue all antibiotics as ordered until complete.  Nutrition is important, eat small frequent meals.  Get lots of rest and drink fluids.  Call your health care provider upon arrival home from hospital and make a follow up appointment for one week.  If your cough worsens, you develop fever greater than 101', you have shaking chills, a fast heartbeat, trouble breathing and/or feel your are breathing much faster than usual, call your healthcare provider.  Make sure you wash your hands frequently.  Secondary Diagnosis:	Atrial fibrillation, unspecified type  Instructions for follow-up, activity and diet:	Atrial fibrillation is the most common heart rhythm problem & has the risk of stroke & heart attack  It helps if you control your blood pressure, not drink more than 1-2 alcohol drinks per day, cut down on caffeine, getting treatment for over active thyroid gland, & getting exercise  Call your doctor if you feel your heart racing or beating unusually, chest tightness or pain, lightheaded, faint, shortness of breath especially with exercise  It is important to take your heart medication as prescribed  You may be on anticoagulation which is very important to take as directed -  Secondary Diagnosis:	Diffuse large B cell lymphoma  Instructions for follow-up, activity and diet:	please follow up at the [facility] with Dr. Swenson, please call for an appointment  Secondary Diagnosis:	Anemia  Instructions for follow-up, activity and diet:	Monitor cbc at the [facility] Principal Discharge DX:	Acute bronchitis  Goal:	IMPROVED  Instructions for follow-up, activity and diet:	Continue all antibiotics as ordered until complete.  Nutrition is important, eat small frequent meals.  Get lots of rest and drink fluids.  Call your health care provider upon arrival home from hospital and make a follow up appointment for one week.  If your cough worsens, you develop fever greater than 101', you have shaking chills, a fast heartbeat, trouble breathing and/or feel your are breathing much faster than usual, call your healthcare provider.  Make sure you wash your hands frequently.  Secondary Diagnosis:	Atrial fibrillation, unspecified type  Instructions for follow-up, activity and diet:	Atrial fibrillation is the most common heart rhythm problem & has the risk of stroke & heart attack  It helps if you control your blood pressure, not drink more than 1-2 alcohol drinks per day, cut down on caffeine, getting treatment for over active thyroid gland, & getting exercise  Call your doctor if you feel your heart racing or beating unusually, chest tightness or pain, lightheaded, faint, shortness of breath especially with exercise  It is important to take your heart medication as prescribed  You may be on anticoagulation which is very important to take as directed -  Secondary Diagnosis:	Diffuse large B cell lymphoma  Instructions for follow-up, activity and diet:	please follow up at the [facility] with Dr. Swenson, please call for an appointment  Secondary Diagnosis:	Anemia  Instructions for follow-up, activity and diet:	Monitor cbc at the [facility]

## 2017-05-02 ENCOUNTER — RESULT REVIEW (OUTPATIENT)
Age: 77
End: 2017-05-02

## 2017-05-03 ENCOUNTER — OUTPATIENT (OUTPATIENT)
Dept: OUTPATIENT SERVICES | Facility: HOSPITAL | Age: 77
LOS: 1 days | End: 2017-05-03
Payer: MEDICARE

## 2017-05-03 ENCOUNTER — APPOINTMENT (OUTPATIENT)
Dept: HEMATOLOGY ONCOLOGY | Facility: CLINIC | Age: 77
End: 2017-05-03

## 2017-05-03 VITALS
HEART RATE: 87 BPM | OXYGEN SATURATION: 94 % | SYSTOLIC BLOOD PRESSURE: 124 MMHG | BODY MASS INDEX: 24.4 KG/M2 | DIASTOLIC BLOOD PRESSURE: 62 MMHG | RESPIRATION RATE: 16 BRPM | WEIGHT: 188.05 LBS | TEMPERATURE: 98.3 F

## 2017-05-03 DIAGNOSIS — Z98.89 OTHER SPECIFIED POSTPROCEDURAL STATES: Chronic | ICD-10-CM

## 2017-05-03 DIAGNOSIS — C85.88 OTHER SPECIFIED TYPES OF NON-HODGKIN LYMPHOMA, LYMPH NODES OF MULTIPLE SITES: ICD-10-CM

## 2017-05-03 DIAGNOSIS — Z98.1 ARTHRODESIS STATUS: Chronic | ICD-10-CM

## 2017-05-03 LAB
BASOPHILS # BLD AUTO: 0 K/UL — SIGNIFICANT CHANGE UP (ref 0–0.2)
BASOPHILS NFR BLD AUTO: 0.7 % — SIGNIFICANT CHANGE UP (ref 0–2)
BLD GP AB SCN SERPL QL: NEGATIVE — SIGNIFICANT CHANGE UP
CULTURE RESULTS: SIGNIFICANT CHANGE UP
CULTURE RESULTS: SIGNIFICANT CHANGE UP
EOSINOPHIL # BLD AUTO: 0.1 K/UL — SIGNIFICANT CHANGE UP (ref 0–0.5)
EOSINOPHIL NFR BLD AUTO: 0.8 % — SIGNIFICANT CHANGE UP (ref 0–6)
HCT VFR BLD CALC: 27.2 % — LOW (ref 39–50)
HGB BLD-MCNC: 8.9 G/DL — LOW (ref 13–17)
LYMPHOCYTES # BLD AUTO: 0.6 K/UL — LOW (ref 1–3.3)
LYMPHOCYTES # BLD AUTO: 9 % — LOW (ref 13–44)
MCHC RBC-ENTMCNC: 30.6 PG — SIGNIFICANT CHANGE UP (ref 27–34)
MCHC RBC-ENTMCNC: 32.9 G/DL — SIGNIFICANT CHANGE UP (ref 32–36)
MCV RBC AUTO: 93.1 FL — SIGNIFICANT CHANGE UP (ref 80–100)
MONOCYTES # BLD AUTO: 0.8 K/UL — SIGNIFICANT CHANGE UP (ref 0–0.9)
MONOCYTES NFR BLD AUTO: 11.6 % — SIGNIFICANT CHANGE UP (ref 2–14)
NEUTROPHILS # BLD AUTO: 5 K/UL — SIGNIFICANT CHANGE UP (ref 1.8–7.4)
NEUTROPHILS NFR BLD AUTO: 77.8 % — HIGH (ref 43–77)
PLATELET # BLD AUTO: 133 K/UL — LOW (ref 150–400)
RBC # BLD: 2.92 M/UL — LOW (ref 4.2–5.8)
RBC # FLD: 15.3 % — HIGH (ref 10.3–14.5)
RH IG SCN BLD-IMP: POSITIVE — SIGNIFICANT CHANGE UP
SPECIMEN SOURCE: SIGNIFICANT CHANGE UP
SPECIMEN SOURCE: SIGNIFICANT CHANGE UP
WBC # BLD: 6.5 K/UL — SIGNIFICANT CHANGE UP (ref 3.8–10.5)
WBC # FLD AUTO: 6.5 K/UL — SIGNIFICANT CHANGE UP (ref 3.8–10.5)

## 2017-05-03 PROCEDURE — 86923 COMPATIBILITY TEST ELECTRIC: CPT

## 2017-05-03 PROCEDURE — 86901 BLOOD TYPING SEROLOGIC RH(D): CPT

## 2017-05-03 PROCEDURE — 86900 BLOOD TYPING SEROLOGIC ABO: CPT

## 2017-05-03 PROCEDURE — 86850 RBC ANTIBODY SCREEN: CPT

## 2017-05-03 RX ORDER — AZITHROMYCIN 250 MG/1
250 TABLET, FILM COATED ORAL
Qty: 6 | Refills: 0 | Status: DISCONTINUED | COMMUNITY
Start: 2017-04-27 | End: 2017-05-03

## 2017-05-04 ENCOUNTER — APPOINTMENT (OUTPATIENT)
Dept: INFUSION THERAPY | Facility: HOSPITAL | Age: 77
End: 2017-05-04

## 2017-05-18 ENCOUNTER — APPOINTMENT (OUTPATIENT)
Dept: HEMATOLOGY ONCOLOGY | Facility: CLINIC | Age: 77
End: 2017-05-18

## 2017-05-18 ENCOUNTER — RESULT REVIEW (OUTPATIENT)
Age: 77
End: 2017-05-18

## 2017-05-18 VITALS
BODY MASS INDEX: 23.39 KG/M2 | DIASTOLIC BLOOD PRESSURE: 63 MMHG | TEMPERATURE: 97.9 F | OXYGEN SATURATION: 92 % | SYSTOLIC BLOOD PRESSURE: 122 MMHG | RESPIRATION RATE: 16 BRPM | HEART RATE: 86 BPM | WEIGHT: 180.34 LBS

## 2017-05-18 LAB
BASOPHILS # BLD AUTO: 0 K/UL — SIGNIFICANT CHANGE UP (ref 0–0.2)
BASOPHILS NFR BLD AUTO: 0.7 % — SIGNIFICANT CHANGE UP (ref 0–2)
EOSINOPHIL # BLD AUTO: 0.1 K/UL — SIGNIFICANT CHANGE UP (ref 0–0.5)
EOSINOPHIL NFR BLD AUTO: 1.7 % — SIGNIFICANT CHANGE UP (ref 0–6)
HCT VFR BLD CALC: 29.3 % — LOW (ref 39–50)
HGB BLD-MCNC: 9.8 G/DL — LOW (ref 13–17)
LYMPHOCYTES # BLD AUTO: 0.7 K/UL — LOW (ref 1–3.3)
LYMPHOCYTES # BLD AUTO: 11.9 % — LOW (ref 13–44)
MCHC RBC-ENTMCNC: 30.7 PG — SIGNIFICANT CHANGE UP (ref 27–34)
MCHC RBC-ENTMCNC: 33.3 G/DL — SIGNIFICANT CHANGE UP (ref 32–36)
MCV RBC AUTO: 92.1 FL — SIGNIFICANT CHANGE UP (ref 80–100)
MONOCYTES # BLD AUTO: 0.9 K/UL — SIGNIFICANT CHANGE UP (ref 0–0.9)
MONOCYTES NFR BLD AUTO: 15.3 % — HIGH (ref 2–14)
NEUTROPHILS # BLD AUTO: 4.1 K/UL — SIGNIFICANT CHANGE UP (ref 1.8–7.4)
NEUTROPHILS NFR BLD AUTO: 70.4 % — SIGNIFICANT CHANGE UP (ref 43–77)
PLATELET # BLD AUTO: 205 K/UL — SIGNIFICANT CHANGE UP (ref 150–400)
RBC # BLD: 3.18 M/UL — LOW (ref 4.2–5.8)
RBC # FLD: 14.8 % — HIGH (ref 10.3–14.5)
WBC # BLD: 5.8 K/UL — SIGNIFICANT CHANGE UP (ref 3.8–10.5)
WBC # FLD AUTO: 5.8 K/UL — SIGNIFICANT CHANGE UP (ref 3.8–10.5)

## 2017-05-21 ENCOUNTER — RESULT REVIEW (OUTPATIENT)
Age: 77
End: 2017-05-21

## 2017-05-21 LAB — TSH SERPL-ACNC: 2.65 UIU/ML

## 2017-06-07 ENCOUNTER — NON-APPOINTMENT (OUTPATIENT)
Age: 77
End: 2017-06-07

## 2017-06-07 ENCOUNTER — APPOINTMENT (OUTPATIENT)
Dept: CARDIOLOGY | Facility: CLINIC | Age: 77
End: 2017-06-07

## 2017-06-07 VITALS
WEIGHT: 180 LBS | HEART RATE: 75 BPM | SYSTOLIC BLOOD PRESSURE: 118 MMHG | BODY MASS INDEX: 23.35 KG/M2 | OXYGEN SATURATION: 99 % | HEIGHT: 73.62 IN | DIASTOLIC BLOOD PRESSURE: 62 MMHG

## 2017-06-07 RX ORDER — APIXABAN 5 MG/1
5 TABLET, FILM COATED ORAL
Qty: 60 | Refills: 2 | Status: DISCONTINUED | COMMUNITY
Start: 2017-02-24 | End: 2017-06-07

## 2017-06-07 RX ORDER — CEPHALEXIN 500 MG/1
500 CAPSULE ORAL 3 TIMES DAILY
Qty: 21 | Refills: 0 | Status: DISCONTINUED | COMMUNITY
Start: 2017-05-03 | End: 2017-06-07

## 2017-06-08 ENCOUNTER — OUTPATIENT (OUTPATIENT)
Dept: OUTPATIENT SERVICES | Facility: HOSPITAL | Age: 77
LOS: 1 days | Discharge: HOME | End: 2017-06-08
Payer: MEDICARE

## 2017-06-08 DIAGNOSIS — Z98.89 OTHER SPECIFIED POSTPROCEDURAL STATES: Chronic | ICD-10-CM

## 2017-06-08 DIAGNOSIS — Z98.1 ARTHRODESIS STATUS: Chronic | ICD-10-CM

## 2017-06-08 PROCEDURE — 77290 THER RAD SIMULAJ FIELD CPLX: CPT | Mod: 26

## 2017-06-08 PROCEDURE — 77334 RADIATION TREATMENT AID(S): CPT | Mod: 26

## 2017-06-19 ENCOUNTER — MESSAGE (OUTPATIENT)
Age: 77
End: 2017-06-19

## 2017-06-20 PROCEDURE — 77321 SPECIAL TELETX PORT PLAN: CPT | Mod: 26

## 2017-06-22 ENCOUNTER — FORM ENCOUNTER (OUTPATIENT)
Age: 77
End: 2017-06-22

## 2017-06-23 ENCOUNTER — OUTPATIENT (OUTPATIENT)
Dept: OUTPATIENT SERVICES | Facility: HOSPITAL | Age: 77
LOS: 1 days | End: 2017-06-23
Payer: MEDICARE

## 2017-06-23 ENCOUNTER — APPOINTMENT (OUTPATIENT)
Dept: NUCLEAR MEDICINE | Facility: IMAGING CENTER | Age: 77
End: 2017-06-23

## 2017-06-23 DIAGNOSIS — C61 MALIGNANT NEOPLASM OF PROSTATE: ICD-10-CM

## 2017-06-23 DIAGNOSIS — Z98.1 ARTHRODESIS STATUS: Chronic | ICD-10-CM

## 2017-06-23 DIAGNOSIS — Z98.89 OTHER SPECIFIED POSTPROCEDURAL STATES: Chronic | ICD-10-CM

## 2017-06-23 PROCEDURE — A9561: CPT

## 2017-06-23 PROCEDURE — 78306 BONE IMAGING WHOLE BODY: CPT

## 2017-06-27 ENCOUNTER — MEDICATION RENEWAL (OUTPATIENT)
Age: 77
End: 2017-06-27

## 2017-06-28 PROCEDURE — 77280 THER RAD SIMULAJ FIELD SMPL: CPT | Mod: 26

## 2017-06-29 PROCEDURE — 77334 RADIATION TREATMENT AID(S): CPT | Mod: 26

## 2017-07-05 PROCEDURE — 77427 RADIATION TX MANAGEMENT X5: CPT

## 2017-07-10 ENCOUNTER — RESULT REVIEW (OUTPATIENT)
Age: 77
End: 2017-07-10

## 2017-07-10 ENCOUNTER — APPOINTMENT (OUTPATIENT)
Dept: HEMATOLOGY ONCOLOGY | Facility: CLINIC | Age: 77
End: 2017-07-10

## 2017-07-10 ENCOUNTER — OUTPATIENT (OUTPATIENT)
Dept: OUTPATIENT SERVICES | Facility: HOSPITAL | Age: 77
LOS: 1 days | Discharge: HOME | End: 2017-07-10

## 2017-07-10 DIAGNOSIS — C85.88 OTHER SPECIFIED TYPES OF NON-HODGKIN LYMPHOMA, LYMPH NODES OF MULTIPLE SITES: ICD-10-CM

## 2017-07-10 DIAGNOSIS — Z98.89 OTHER SPECIFIED POSTPROCEDURAL STATES: Chronic | ICD-10-CM

## 2017-07-10 DIAGNOSIS — Z98.1 ARTHRODESIS STATUS: Chronic | ICD-10-CM

## 2017-07-10 LAB
BASOPHILS # BLD AUTO: 0 K/UL — SIGNIFICANT CHANGE UP (ref 0–0.2)
BASOPHILS NFR BLD AUTO: 0.4 % — SIGNIFICANT CHANGE UP (ref 0–2)
EOSINOPHIL # BLD AUTO: 0.1 K/UL — SIGNIFICANT CHANGE UP (ref 0–0.5)
EOSINOPHIL NFR BLD AUTO: 2.9 % — SIGNIFICANT CHANGE UP (ref 0–6)
HCT VFR BLD CALC: 36.8 % — LOW (ref 39–50)
HGB BLD-MCNC: 11.6 G/DL — LOW (ref 13–17)
LYMPHOCYTES # BLD AUTO: 1 K/UL — SIGNIFICANT CHANGE UP (ref 1–3.3)
LYMPHOCYTES # BLD AUTO: 19.8 % — SIGNIFICANT CHANGE UP (ref 13–44)
MCHC RBC-ENTMCNC: 28 PG — SIGNIFICANT CHANGE UP (ref 27–34)
MCHC RBC-ENTMCNC: 31.6 G/DL — LOW (ref 32–36)
MCV RBC AUTO: 88.4 FL — SIGNIFICANT CHANGE UP (ref 80–100)
MONOCYTES # BLD AUTO: 0.7 K/UL — SIGNIFICANT CHANGE UP (ref 0–0.9)
MONOCYTES NFR BLD AUTO: 14.3 % — HIGH (ref 2–14)
NEUTROPHILS # BLD AUTO: 3 K/UL — SIGNIFICANT CHANGE UP (ref 1.8–7.4)
NEUTROPHILS NFR BLD AUTO: 62.6 % — SIGNIFICANT CHANGE UP (ref 43–77)
PLATELET # BLD AUTO: 147 K/UL — LOW (ref 150–400)
RBC # BLD: 4.16 M/UL — LOW (ref 4.2–5.8)
RBC # FLD: 15.4 % — HIGH (ref 10.3–14.5)
WBC # BLD: 4.8 K/UL — SIGNIFICANT CHANGE UP (ref 3.8–10.5)
WBC # FLD AUTO: 4.8 K/UL — SIGNIFICANT CHANGE UP (ref 3.8–10.5)

## 2017-07-11 PROCEDURE — 77427 RADIATION TX MANAGEMENT X5: CPT

## 2017-07-19 PROCEDURE — 77427 RADIATION TX MANAGEMENT X5: CPT

## 2017-08-18 ENCOUNTER — APPOINTMENT (OUTPATIENT)
Dept: FAMILY MEDICINE | Facility: CLINIC | Age: 77
End: 2017-08-18
Payer: MEDICARE

## 2017-08-18 VITALS
BODY MASS INDEX: 23.46 KG/M2 | RESPIRATION RATE: 16 BRPM | OXYGEN SATURATION: 98 % | DIASTOLIC BLOOD PRESSURE: 70 MMHG | HEIGHT: 73 IN | SYSTOLIC BLOOD PRESSURE: 120 MMHG | HEART RATE: 75 BPM | WEIGHT: 177 LBS

## 2017-08-18 DIAGNOSIS — L85.3 XEROSIS CUTIS: ICD-10-CM

## 2017-08-18 PROCEDURE — 99213 OFFICE O/P EST LOW 20 MIN: CPT

## 2017-08-18 RX ORDER — CLINDAMYCIN HYDROCHLORIDE 300 MG/1
300 CAPSULE ORAL
Qty: 28 | Refills: 0 | Status: COMPLETED | COMMUNITY
Start: 2017-06-14

## 2017-09-07 ENCOUNTER — APPOINTMENT (OUTPATIENT)
Dept: RADIATION ONCOLOGY | Facility: CLINIC | Age: 77
End: 2017-09-07
Payer: MEDICARE

## 2017-09-07 PROCEDURE — 99024 POSTOP FOLLOW-UP VISIT: CPT

## 2017-09-08 LAB — PSA SERPL-MCNC: 15.53 NG/ML

## 2017-09-25 ENCOUNTER — OUTPATIENT (OUTPATIENT)
Dept: OUTPATIENT SERVICES | Facility: HOSPITAL | Age: 77
LOS: 1 days | Discharge: HOME | End: 2017-09-25

## 2017-09-25 DIAGNOSIS — Z98.89 OTHER SPECIFIED POSTPROCEDURAL STATES: Chronic | ICD-10-CM

## 2017-09-25 DIAGNOSIS — C85.88 OTHER SPECIFIED TYPES OF NON-HODGKIN LYMPHOMA, LYMPH NODES OF MULTIPLE SITES: ICD-10-CM

## 2017-09-25 DIAGNOSIS — Z98.1 ARTHRODESIS STATUS: Chronic | ICD-10-CM

## 2017-09-26 ENCOUNTER — LABORATORY RESULT (OUTPATIENT)
Age: 77
End: 2017-09-26

## 2017-09-26 ENCOUNTER — APPOINTMENT (OUTPATIENT)
Dept: UROLOGY | Facility: CLINIC | Age: 77
End: 2017-09-26
Payer: MEDICARE

## 2017-09-26 ENCOUNTER — OUTPATIENT (OUTPATIENT)
Dept: OUTPATIENT SERVICES | Facility: HOSPITAL | Age: 77
LOS: 1 days | End: 2017-09-26
Payer: MEDICARE

## 2017-09-26 ENCOUNTER — APPOINTMENT (OUTPATIENT)
Dept: HEMATOLOGY ONCOLOGY | Facility: CLINIC | Age: 77
End: 2017-09-26
Payer: MEDICARE

## 2017-09-26 ENCOUNTER — RESULT REVIEW (OUTPATIENT)
Age: 77
End: 2017-09-26

## 2017-09-26 VITALS
OXYGEN SATURATION: 98 % | RESPIRATION RATE: 16 BRPM | SYSTOLIC BLOOD PRESSURE: 131 MMHG | WEIGHT: 175.91 LBS | HEART RATE: 68 BPM | TEMPERATURE: 98.1 F | DIASTOLIC BLOOD PRESSURE: 67 MMHG | BODY MASS INDEX: 23.21 KG/M2

## 2017-09-26 VITALS
RESPIRATION RATE: 16 BRPM | SYSTOLIC BLOOD PRESSURE: 122 MMHG | TEMPERATURE: 98.7 F | HEART RATE: 73 BPM | DIASTOLIC BLOOD PRESSURE: 65 MMHG

## 2017-09-26 DIAGNOSIS — D64.81 ANEMIA DUE TO ANTINEOPLASTIC CHEMOTHERAPY: ICD-10-CM

## 2017-09-26 DIAGNOSIS — Z98.89 OTHER SPECIFIED POSTPROCEDURAL STATES: Chronic | ICD-10-CM

## 2017-09-26 DIAGNOSIS — R35.0 FREQUENCY OF MICTURITION: ICD-10-CM

## 2017-09-26 DIAGNOSIS — Z98.1 ARTHRODESIS STATUS: Chronic | ICD-10-CM

## 2017-09-26 DIAGNOSIS — T45.1X5A ANEMIA DUE TO ANTINEOPLASTIC CHEMOTHERAPY: ICD-10-CM

## 2017-09-26 DIAGNOSIS — H26.9 UNSPECIFIED CATARACT: ICD-10-CM

## 2017-09-26 LAB
ALBUMIN SERPL ELPH-MCNC: 3.6 G/DL
ALBUMIN SERPL ELPH-MCNC: 3.9 G/DL
ALP BLD-CCNC: 112 U/L
ALP BLD-CCNC: 83 U/L
ALT SERPL-CCNC: 10 U/L
ALT SERPL-CCNC: 48 U/L
ANION GAP SERPL CALC-SCNC: 12 MMOL/L
ANION GAP SERPL CALC-SCNC: 14 MMOL/L
AST SERPL-CCNC: 20 U/L
AST SERPL-CCNC: 24 U/L
BASOPHILS # BLD AUTO: 0 K/UL — SIGNIFICANT CHANGE UP (ref 0–0.2)
BASOPHILS NFR BLD AUTO: 0.8 % — SIGNIFICANT CHANGE UP (ref 0–2)
BILIRUB SERPL-MCNC: 0.3 MG/DL
BILIRUB SERPL-MCNC: 0.3 MG/DL
BUN SERPL-MCNC: 13 MG/DL
BUN SERPL-MCNC: 15 MG/DL
CALCIUM SERPL-MCNC: 8.5 MG/DL
CALCIUM SERPL-MCNC: 9 MG/DL
CHLORIDE SERPL-SCNC: 101 MMOL/L
CHLORIDE SERPL-SCNC: 99 MMOL/L
CO2 SERPL-SCNC: 26 MMOL/L
CO2 SERPL-SCNC: 29 MMOL/L
CREAT SERPL-MCNC: 0.51 MG/DL
CREAT SERPL-MCNC: 0.59 MG/DL
EOSINOPHIL # BLD AUTO: 0.1 K/UL — SIGNIFICANT CHANGE UP (ref 0–0.5)
EOSINOPHIL NFR BLD AUTO: 2.2 % — SIGNIFICANT CHANGE UP (ref 0–6)
GLUCOSE SERPL-MCNC: 116 MG/DL
GLUCOSE SERPL-MCNC: 89 MG/DL
HCT VFR BLD CALC: 37 % — LOW (ref 39–50)
HGB BLD-MCNC: 12.1 G/DL — LOW (ref 13–17)
LDH SERPL-CCNC: 144 U/L
LDH SERPL-CCNC: 150 U/L
LYMPHOCYTES # BLD AUTO: 1 K/UL — SIGNIFICANT CHANGE UP (ref 1–3.3)
LYMPHOCYTES # BLD AUTO: 18.3 % — SIGNIFICANT CHANGE UP (ref 13–44)
MCHC RBC-ENTMCNC: 29.2 PG — SIGNIFICANT CHANGE UP (ref 27–34)
MCHC RBC-ENTMCNC: 32.7 G/DL — SIGNIFICANT CHANGE UP (ref 32–36)
MCV RBC AUTO: 89.2 FL — SIGNIFICANT CHANGE UP (ref 80–100)
MONOCYTES # BLD AUTO: 0.7 K/UL — SIGNIFICANT CHANGE UP (ref 0–0.9)
MONOCYTES NFR BLD AUTO: 12.8 % — SIGNIFICANT CHANGE UP (ref 2–14)
NEUTROPHILS # BLD AUTO: 3.6 K/UL — SIGNIFICANT CHANGE UP (ref 1.8–7.4)
NEUTROPHILS NFR BLD AUTO: 65.9 % — SIGNIFICANT CHANGE UP (ref 43–77)
PLATELET # BLD AUTO: 158 K/UL — SIGNIFICANT CHANGE UP (ref 150–400)
POTASSIUM SERPL-SCNC: 4.3 MMOL/L
POTASSIUM SERPL-SCNC: 4.4 MMOL/L
PROT SERPL-MCNC: 5.5 G/DL
PROT SERPL-MCNC: 6.2 G/DL
RBC # BLD: 4.15 M/UL — LOW (ref 4.2–5.8)
RBC # FLD: 16.4 % — HIGH (ref 10.3–14.5)
SODIUM SERPL-SCNC: 139 MMOL/L
SODIUM SERPL-SCNC: 142 MMOL/L
WBC # BLD: 5.5 K/UL — SIGNIFICANT CHANGE UP (ref 3.8–10.5)
WBC # FLD AUTO: 5.5 K/UL — SIGNIFICANT CHANGE UP (ref 3.8–10.5)

## 2017-09-26 PROCEDURE — 99214 OFFICE O/P EST MOD 30 MIN: CPT

## 2017-09-26 PROCEDURE — 99213 OFFICE O/P EST LOW 20 MIN: CPT

## 2017-09-26 RX ORDER — DEGARELIX 120 MG
120 KIT SUBCUTANEOUS
Qty: 2 | Refills: 0 | Status: COMPLETED | OUTPATIENT
Start: 2017-09-26 | End: 2017-09-26

## 2017-09-26 RX ORDER — DEGARELIX 120 MG
120 KIT SUBCUTANEOUS
Qty: 0 | Refills: 0 | Status: COMPLETED | OUTPATIENT
Start: 2017-09-26

## 2017-09-26 RX ORDER — DILTIAZEM HYDROCHLORIDE 120 MG/1
120 CAPSULE, EXTENDED RELEASE ORAL
Qty: 30 | Refills: 5 | Status: DISCONTINUED | COMMUNITY
Start: 2017-02-24 | End: 2017-09-26

## 2017-09-26 RX ORDER — FLUTICASONE PROPIONATE 50 UG/1
50 SPRAY, METERED NASAL TWICE DAILY
Qty: 1 | Refills: 0 | Status: DISCONTINUED | COMMUNITY
Start: 2017-05-03 | End: 2017-09-26

## 2017-09-26 RX ORDER — METOCLOPRAMIDE 10 MG/1
10 TABLET ORAL
Qty: 60 | Refills: 6 | Status: DISCONTINUED | COMMUNITY
Start: 2017-01-03 | End: 2017-09-26

## 2017-09-27 PROCEDURE — 96402 CHEMO HORMON ANTINEOPL SQ/IM: CPT

## 2017-09-28 DIAGNOSIS — C61 MALIGNANT NEOPLASM OF PROSTATE: ICD-10-CM

## 2017-10-19 ENCOUNTER — FORM ENCOUNTER (OUTPATIENT)
Age: 77
End: 2017-10-19

## 2017-10-20 ENCOUNTER — OUTPATIENT (OUTPATIENT)
Dept: OUTPATIENT SERVICES | Facility: HOSPITAL | Age: 77
LOS: 1 days | End: 2017-10-20
Payer: MEDICARE

## 2017-10-20 ENCOUNTER — APPOINTMENT (OUTPATIENT)
Dept: NUCLEAR MEDICINE | Facility: CLINIC | Age: 77
End: 2017-10-20

## 2017-10-20 DIAGNOSIS — Z98.1 ARTHRODESIS STATUS: Chronic | ICD-10-CM

## 2017-10-20 DIAGNOSIS — Z98.89 OTHER SPECIFIED POSTPROCEDURAL STATES: Chronic | ICD-10-CM

## 2017-10-20 DIAGNOSIS — Z00.8 ENCOUNTER FOR OTHER GENERAL EXAMINATION: ICD-10-CM

## 2017-10-20 PROCEDURE — A9552: CPT

## 2017-10-20 PROCEDURE — 78816 PET IMAGE W/CT FULL BODY: CPT

## 2017-10-20 PROCEDURE — 78816 PET IMAGE W/CT FULL BODY: CPT | Mod: 26,PS

## 2017-12-22 LAB
ALBUMIN SERPL ELPH-MCNC: 4.1 G/DL
ALP BLD-CCNC: 98 U/L
ALT SERPL-CCNC: 9 U/L
ANION GAP SERPL CALC-SCNC: 12 MMOL/L
AST SERPL-CCNC: 16 U/L
BILIRUB SERPL-MCNC: 0.3 MG/DL
BUN SERPL-MCNC: 16 MG/DL
CALCIUM SERPL-MCNC: 9.3 MG/DL
CHLORIDE SERPL-SCNC: 100 MMOL/L
CO2 SERPL-SCNC: 31 MMOL/L
CREAT SERPL-MCNC: 0.56 MG/DL
DEPRECATED KAPPA LC FREE/LAMBDA SER: 1.2 RATIO
GLUCOSE SERPL-MCNC: 106 MG/DL
IGA SER QL IEP: 263 MG/DL
IGG SER QL IEP: 764 MG/DL
IGM SER QL IEP: 149 MG/DL
KAPPA LC CSF-MCNC: 1.57 MG/DL
KAPPA LC SERPL-MCNC: 1.89 MG/DL
POTASSIUM SERPL-SCNC: 5.1 MMOL/L
PROT SERPL-MCNC: 6.6 G/DL
SODIUM SERPL-SCNC: 143 MMOL/L

## 2018-01-03 ENCOUNTER — APPOINTMENT (OUTPATIENT)
Dept: FAMILY MEDICINE | Facility: CLINIC | Age: 78
End: 2018-01-03
Payer: MEDICARE

## 2018-01-03 VITALS
HEART RATE: 68 BPM | SYSTOLIC BLOOD PRESSURE: 122 MMHG | WEIGHT: 174 LBS | HEIGHT: 73 IN | BODY MASS INDEX: 23.06 KG/M2 | DIASTOLIC BLOOD PRESSURE: 60 MMHG | OXYGEN SATURATION: 98 %

## 2018-01-03 DIAGNOSIS — L97.409 NON-PRESSURE CHRONIC ULCER OF UNSPECIFIED HEEL AND MIDFOOT WITH UNSPECIFIED SEVERITY: ICD-10-CM

## 2018-01-03 DIAGNOSIS — M86.179 OTHER ACUTE OSTEOMYELITIS, UNSPECIFIED ANKLE AND FOOT: ICD-10-CM

## 2018-01-03 PROCEDURE — 90670 PCV13 VACCINE IM: CPT

## 2018-01-03 PROCEDURE — 99214 OFFICE O/P EST MOD 30 MIN: CPT | Mod: 25

## 2018-01-03 PROCEDURE — G0009: CPT

## 2018-01-08 ENCOUNTER — LABORATORY RESULT (OUTPATIENT)
Age: 78
End: 2018-01-08

## 2018-01-09 LAB
C DIFF TOX GENS STL QL NAA+PROBE: NORMAL
CDIFF BY PCR: NOT DETECTED

## 2018-01-10 LAB
DEPRECATED O AND P PREP STL: NORMAL
WRIGHT STN STL: NEGATIVE

## 2018-01-11 LAB — BACTERIA STL CULT: NORMAL

## 2018-01-24 ENCOUNTER — FORM ENCOUNTER (OUTPATIENT)
Age: 78
End: 2018-01-24

## 2018-01-25 ENCOUNTER — OUTPATIENT (OUTPATIENT)
Dept: OUTPATIENT SERVICES | Facility: HOSPITAL | Age: 78
LOS: 1 days | End: 2018-01-25
Payer: MEDICARE

## 2018-01-25 ENCOUNTER — APPOINTMENT (OUTPATIENT)
Dept: NUCLEAR MEDICINE | Facility: CLINIC | Age: 78
End: 2018-01-25

## 2018-01-25 ENCOUNTER — OUTPATIENT (OUTPATIENT)
Dept: OUTPATIENT SERVICES | Facility: HOSPITAL | Age: 78
LOS: 1 days | Discharge: HOME | End: 2018-01-25

## 2018-01-25 DIAGNOSIS — Z98.89 OTHER SPECIFIED POSTPROCEDURAL STATES: Chronic | ICD-10-CM

## 2018-01-25 DIAGNOSIS — C85.88 OTHER SPECIFIED TYPES OF NON-HODGKIN LYMPHOMA, LYMPH NODES OF MULTIPLE SITES: ICD-10-CM

## 2018-01-25 DIAGNOSIS — C83.30 DIFFUSE LARGE B-CELL LYMPHOMA, UNSPECIFIED SITE: ICD-10-CM

## 2018-01-25 DIAGNOSIS — Z98.1 ARTHRODESIS STATUS: Chronic | ICD-10-CM

## 2018-01-25 PROCEDURE — A9552: CPT

## 2018-01-25 PROCEDURE — 78815 PET IMAGE W/CT SKULL-THIGH: CPT

## 2018-01-25 PROCEDURE — 78815 PET IMAGE W/CT SKULL-THIGH: CPT | Mod: 26,KX,PS

## 2018-01-29 ENCOUNTER — RESULT REVIEW (OUTPATIENT)
Age: 78
End: 2018-01-29

## 2018-01-29 ENCOUNTER — LABORATORY RESULT (OUTPATIENT)
Age: 78
End: 2018-01-29

## 2018-01-29 ENCOUNTER — APPOINTMENT (OUTPATIENT)
Dept: HEMATOLOGY ONCOLOGY | Facility: CLINIC | Age: 78
End: 2018-01-29
Payer: MEDICARE

## 2018-01-29 VITALS
BODY MASS INDEX: 22.98 KG/M2 | HEART RATE: 75 BPM | DIASTOLIC BLOOD PRESSURE: 70 MMHG | RESPIRATION RATE: 16 BRPM | OXYGEN SATURATION: 98 % | WEIGHT: 174.14 LBS | TEMPERATURE: 98 F | SYSTOLIC BLOOD PRESSURE: 110 MMHG

## 2018-01-29 DIAGNOSIS — M86.679 OTHER CHRONIC OSTEOMYELITIS, UNSPECIFIED ANKLE AND FOOT: ICD-10-CM

## 2018-01-29 LAB
BASOPHILS # BLD AUTO: 0.1 K/UL — SIGNIFICANT CHANGE UP (ref 0–0.2)
BASOPHILS NFR BLD AUTO: 1.1 % — SIGNIFICANT CHANGE UP (ref 0–2)
EOSINOPHIL # BLD AUTO: 0.2 K/UL — SIGNIFICANT CHANGE UP (ref 0–0.5)
EOSINOPHIL NFR BLD AUTO: 4.1 % — SIGNIFICANT CHANGE UP (ref 0–6)
HCT VFR BLD CALC: 35.9 % — LOW (ref 39–50)
HGB BLD-MCNC: 12.2 G/DL — LOW (ref 13–17)
LYMPHOCYTES # BLD AUTO: 1.2 K/UL — SIGNIFICANT CHANGE UP (ref 1–3.3)
LYMPHOCYTES # BLD AUTO: 22.7 % — SIGNIFICANT CHANGE UP (ref 13–44)
MCHC RBC-ENTMCNC: 31.1 PG — SIGNIFICANT CHANGE UP (ref 27–34)
MCHC RBC-ENTMCNC: 34.1 G/DL — SIGNIFICANT CHANGE UP (ref 32–36)
MCV RBC AUTO: 91.3 FL — SIGNIFICANT CHANGE UP (ref 80–100)
MONOCYTES # BLD AUTO: 0.6 K/UL — SIGNIFICANT CHANGE UP (ref 0–0.9)
MONOCYTES NFR BLD AUTO: 12.3 % — SIGNIFICANT CHANGE UP (ref 2–14)
NEUTROPHILS # BLD AUTO: 3 K/UL — SIGNIFICANT CHANGE UP (ref 1.8–7.4)
NEUTROPHILS NFR BLD AUTO: 59.8 % — SIGNIFICANT CHANGE UP (ref 43–77)
PLATELET # BLD AUTO: 140 K/UL — LOW (ref 150–400)
RBC # BLD: 3.93 M/UL — LOW (ref 4.2–5.8)
RBC # FLD: 12.6 % — SIGNIFICANT CHANGE UP (ref 10.3–14.5)
WBC # BLD: 5.1 K/UL — SIGNIFICANT CHANGE UP (ref 3.8–10.5)
WBC # FLD AUTO: 5.1 K/UL — SIGNIFICANT CHANGE UP (ref 3.8–10.5)

## 2018-01-29 PROCEDURE — 99215 OFFICE O/P EST HI 40 MIN: CPT

## 2018-02-02 LAB
ALBUMIN SERPL ELPH-MCNC: 3.6 G/DL
ALP BLD-CCNC: 90 U/L
ALT SERPL-CCNC: 9 U/L
ANION GAP SERPL CALC-SCNC: 12 MMOL/L
AST SERPL-CCNC: 19 U/L
BILIRUB SERPL-MCNC: 0.4 MG/DL
BUN SERPL-MCNC: 13 MG/DL
CALCIUM SERPL-MCNC: 9.1 MG/DL
CHLORIDE SERPL-SCNC: 99 MMOL/L
CO2 SERPL-SCNC: 30 MMOL/L
CREAT SERPL-MCNC: 0.65 MG/DL
GLUCOSE SERPL-MCNC: 105 MG/DL
POTASSIUM SERPL-SCNC: 4.4 MMOL/L
PROT SERPL-MCNC: 6.1 G/DL
SODIUM SERPL-SCNC: 141 MMOL/L

## 2018-04-03 ENCOUNTER — OUTPATIENT (OUTPATIENT)
Dept: OUTPATIENT SERVICES | Facility: HOSPITAL | Age: 78
LOS: 1 days | Discharge: HOME | End: 2018-04-03

## 2018-04-03 DIAGNOSIS — Z98.89 OTHER SPECIFIED POSTPROCEDURAL STATES: Chronic | ICD-10-CM

## 2018-04-03 DIAGNOSIS — C85.88 OTHER SPECIFIED TYPES OF NON-HODGKIN LYMPHOMA, LYMPH NODES OF MULTIPLE SITES: ICD-10-CM

## 2018-04-03 DIAGNOSIS — Z98.1 ARTHRODESIS STATUS: Chronic | ICD-10-CM

## 2018-04-05 ENCOUNTER — RESULT REVIEW (OUTPATIENT)
Age: 78
End: 2018-04-05

## 2018-04-05 ENCOUNTER — APPOINTMENT (OUTPATIENT)
Dept: HEMATOLOGY ONCOLOGY | Facility: CLINIC | Age: 78
End: 2018-04-05
Payer: MEDICARE

## 2018-04-05 VITALS
HEART RATE: 82 BPM | OXYGEN SATURATION: 96 % | DIASTOLIC BLOOD PRESSURE: 66 MMHG | BODY MASS INDEX: 22.4 KG/M2 | WEIGHT: 169.75 LBS | TEMPERATURE: 98 F | RESPIRATION RATE: 16 BRPM | SYSTOLIC BLOOD PRESSURE: 119 MMHG

## 2018-04-05 LAB
B2 MICROGLOB SERPL-MCNC: 2.4 MG/L
BASOPHILS # BLD AUTO: 0 K/UL — SIGNIFICANT CHANGE UP (ref 0–0.2)
BASOPHILS NFR BLD AUTO: 0.9 % — SIGNIFICANT CHANGE UP (ref 0–2)
DEPRECATED KAPPA LC FREE/LAMBDA SER: 1.53 RATIO
EOSINOPHIL # BLD AUTO: 0.2 K/UL — SIGNIFICANT CHANGE UP (ref 0–0.5)
EOSINOPHIL NFR BLD AUTO: 5 % — SIGNIFICANT CHANGE UP (ref 0–6)
HCT VFR BLD CALC: 37.8 % — LOW (ref 39–50)
HGB BLD-MCNC: 12.5 G/DL — LOW (ref 13–17)
IGA SER QL IEP: 256 MG/DL
IGG SER QL IEP: 765 MG/DL
IGM SER QL IEP: 136 MG/DL
KAPPA LC CSF-MCNC: 1.15 MG/DL
KAPPA LC SERPL-MCNC: 1.76 MG/DL
LDH SERPL-CCNC: 134 U/L
LYMPHOCYTES # BLD AUTO: 0.8 K/UL — LOW (ref 1–3.3)
LYMPHOCYTES # BLD AUTO: 19.8 % — SIGNIFICANT CHANGE UP (ref 13–44)
MCHC RBC-ENTMCNC: 30.3 PG — SIGNIFICANT CHANGE UP (ref 27–34)
MCHC RBC-ENTMCNC: 33.1 G/DL — SIGNIFICANT CHANGE UP (ref 32–36)
MCV RBC AUTO: 91.7 FL — SIGNIFICANT CHANGE UP (ref 80–100)
MONOCYTES # BLD AUTO: 0.6 K/UL — SIGNIFICANT CHANGE UP (ref 0–0.9)
MONOCYTES NFR BLD AUTO: 15.2 % — HIGH (ref 2–14)
NEUTROPHILS # BLD AUTO: 2.5 K/UL — SIGNIFICANT CHANGE UP (ref 1.8–7.4)
NEUTROPHILS NFR BLD AUTO: 59.1 % — SIGNIFICANT CHANGE UP (ref 43–77)
PLATELET # BLD AUTO: 158 K/UL — SIGNIFICANT CHANGE UP (ref 150–400)
RBC # BLD: 4.13 M/UL — LOW (ref 4.2–5.8)
RBC # FLD: 12.4 % — SIGNIFICANT CHANGE UP (ref 10.3–14.5)
WBC # BLD: 4.3 K/UL — SIGNIFICANT CHANGE UP (ref 3.8–10.5)
WBC # FLD AUTO: 4.3 K/UL — SIGNIFICANT CHANGE UP (ref 3.8–10.5)

## 2018-04-05 PROCEDURE — 99215 OFFICE O/P EST HI 40 MIN: CPT

## 2018-04-08 LAB
ALBUMIN SERPL ELPH-MCNC: 3.8 G/DL
ALP BLD-CCNC: 89 U/L
ALT SERPL-CCNC: 8 U/L
ANION GAP SERPL CALC-SCNC: 12 MMOL/L
AST SERPL-CCNC: 16 U/L
B2 MICROGLOB SERPL-MCNC: 2.6 MG/L
BILIRUB SERPL-MCNC: 0.4 MG/DL
BUN SERPL-MCNC: 11 MG/DL
CALCIUM SERPL-MCNC: 9.3 MG/DL
CHLORIDE SERPL-SCNC: 99 MMOL/L
CO2 SERPL-SCNC: 32 MMOL/L
CREAT SERPL-MCNC: 0.59 MG/DL
DEPRECATED KAPPA LC FREE/LAMBDA SER: 0.82 RATIO
GLUCOSE SERPL-MCNC: 110 MG/DL
IGA SER QL IEP: 270 MG/DL
IGG SER QL IEP: 742 MG/DL
IGM SER QL IEP: 137 MG/DL
KAPPA LC CSF-MCNC: 1.91 MG/DL
KAPPA LC SERPL-MCNC: 1.56 MG/DL
LDH SERPL-CCNC: 147 U/L
POTASSIUM SERPL-SCNC: 4.2 MMOL/L
PROT SERPL-MCNC: 6 G/DL
SODIUM SERPL-SCNC: 143 MMOL/L

## 2018-04-10 ENCOUNTER — APPOINTMENT (OUTPATIENT)
Dept: HEMATOLOGY ONCOLOGY | Facility: CLINIC | Age: 78
End: 2018-04-10

## 2018-05-10 LAB — PSA SERPL-MCNC: 3.18 NG/ML

## 2018-05-14 ENCOUNTER — FORM ENCOUNTER (OUTPATIENT)
Age: 78
End: 2018-05-14

## 2018-05-15 ENCOUNTER — APPOINTMENT (OUTPATIENT)
Dept: NUCLEAR MEDICINE | Facility: CLINIC | Age: 78
End: 2018-05-15

## 2018-05-15 ENCOUNTER — OUTPATIENT (OUTPATIENT)
Dept: OUTPATIENT SERVICES | Facility: HOSPITAL | Age: 78
LOS: 1 days | End: 2018-05-15
Payer: MEDICARE

## 2018-05-15 ENCOUNTER — OUTPATIENT (OUTPATIENT)
Dept: OUTPATIENT SERVICES | Facility: HOSPITAL | Age: 78
LOS: 1 days | Discharge: HOME | End: 2018-05-15

## 2018-05-15 DIAGNOSIS — Z98.1 ARTHRODESIS STATUS: Chronic | ICD-10-CM

## 2018-05-15 DIAGNOSIS — C85.88 OTHER SPECIFIED TYPES OF NON-HODGKIN LYMPHOMA, LYMPH NODES OF MULTIPLE SITES: ICD-10-CM

## 2018-05-15 DIAGNOSIS — Z98.89 OTHER SPECIFIED POSTPROCEDURAL STATES: Chronic | ICD-10-CM

## 2018-05-15 DIAGNOSIS — C83.30 DIFFUSE LARGE B-CELL LYMPHOMA, UNSPECIFIED SITE: ICD-10-CM

## 2018-05-15 PROCEDURE — 78815 PET IMAGE W/CT SKULL-THIGH: CPT

## 2018-05-15 PROCEDURE — 78815 PET IMAGE W/CT SKULL-THIGH: CPT | Mod: 26,KX,PS

## 2018-05-15 PROCEDURE — A9552: CPT

## 2018-05-17 ENCOUNTER — LABORATORY RESULT (OUTPATIENT)
Age: 78
End: 2018-05-17

## 2018-05-17 ENCOUNTER — APPOINTMENT (OUTPATIENT)
Dept: HEMATOLOGY ONCOLOGY | Facility: CLINIC | Age: 78
End: 2018-05-17
Payer: MEDICARE

## 2018-05-17 ENCOUNTER — RESULT REVIEW (OUTPATIENT)
Age: 78
End: 2018-05-17

## 2018-05-17 VITALS
DIASTOLIC BLOOD PRESSURE: 65 MMHG | WEIGHT: 163.14 LBS | TEMPERATURE: 98 F | OXYGEN SATURATION: 95 % | BODY MASS INDEX: 21.52 KG/M2 | SYSTOLIC BLOOD PRESSURE: 135 MMHG | HEART RATE: 83 BPM | RESPIRATION RATE: 16 BRPM

## 2018-05-17 DIAGNOSIS — R22.41 LOCALIZED SWELLING, MASS AND LUMP, RIGHT LOWER LIMB: ICD-10-CM

## 2018-05-17 LAB
BASOPHILS # BLD AUTO: 0 K/UL — SIGNIFICANT CHANGE UP (ref 0–0.2)
BASOPHILS NFR BLD AUTO: 0.5 % — SIGNIFICANT CHANGE UP (ref 0–2)
EOSINOPHIL # BLD AUTO: 0.2 K/UL — SIGNIFICANT CHANGE UP (ref 0–0.5)
EOSINOPHIL NFR BLD AUTO: 4.7 % — SIGNIFICANT CHANGE UP (ref 0–6)
HCT VFR BLD CALC: 37.7 % — LOW (ref 39–50)
HGB BLD-MCNC: 12.9 G/DL — LOW (ref 13–17)
LYMPHOCYTES # BLD AUTO: 1 K/UL — SIGNIFICANT CHANGE UP (ref 1–3.3)
LYMPHOCYTES # BLD AUTO: 24.7 % — SIGNIFICANT CHANGE UP (ref 13–44)
MCHC RBC-ENTMCNC: 31.1 PG — SIGNIFICANT CHANGE UP (ref 27–34)
MCHC RBC-ENTMCNC: 34.1 G/DL — SIGNIFICANT CHANGE UP (ref 32–36)
MCV RBC AUTO: 91.4 FL — SIGNIFICANT CHANGE UP (ref 80–100)
MONOCYTES # BLD AUTO: 0.5 K/UL — SIGNIFICANT CHANGE UP (ref 0–0.9)
MONOCYTES NFR BLD AUTO: 12.4 % — SIGNIFICANT CHANGE UP (ref 2–14)
NEUTROPHILS # BLD AUTO: 2.3 K/UL — SIGNIFICANT CHANGE UP (ref 1.8–7.4)
NEUTROPHILS NFR BLD AUTO: 57.7 % — SIGNIFICANT CHANGE UP (ref 43–77)
PLATELET # BLD AUTO: 149 K/UL — LOW (ref 150–400)
RBC # BLD: 4.13 M/UL — LOW (ref 4.2–5.8)
RBC # FLD: 12.3 % — SIGNIFICANT CHANGE UP (ref 10.3–14.5)
WBC # BLD: 4.1 K/UL — SIGNIFICANT CHANGE UP (ref 3.8–10.5)
WBC # FLD AUTO: 4.1 K/UL — SIGNIFICANT CHANGE UP (ref 3.8–10.5)

## 2018-05-17 PROCEDURE — 99214 OFFICE O/P EST MOD 30 MIN: CPT

## 2018-05-17 RX ORDER — MINOCYCLINE HYDROCHLORIDE 100 MG/1
100 CAPSULE ORAL
Refills: 0 | Status: DISCONTINUED | COMMUNITY
End: 2018-05-17

## 2018-05-17 RX ORDER — TRIAMCINOLONE ACETONIDE 1 MG/G
0.1 CREAM TOPICAL
Qty: 80 | Refills: 3 | Status: DISCONTINUED | COMMUNITY
Start: 2017-08-18 | End: 2018-05-17

## 2018-05-17 RX ORDER — DILTIAZEM HYDROCHLORIDE 120 MG/1
120 CAPSULE, EXTENDED RELEASE ORAL
Refills: 0 | Status: DISCONTINUED | COMMUNITY
Start: 2018-04-08 | End: 2018-05-17

## 2018-05-22 ENCOUNTER — APPOINTMENT (OUTPATIENT)
Dept: UROLOGY | Facility: CLINIC | Age: 78
End: 2018-05-22
Payer: MEDICARE

## 2018-05-22 PROCEDURE — 99214 OFFICE O/P EST MOD 30 MIN: CPT

## 2018-07-13 ENCOUNTER — OUTPATIENT (OUTPATIENT)
Dept: OUTPATIENT SERVICES | Facility: HOSPITAL | Age: 78
LOS: 1 days | Discharge: HOME | End: 2018-07-13

## 2018-07-13 DIAGNOSIS — Z98.89 OTHER SPECIFIED POSTPROCEDURAL STATES: Chronic | ICD-10-CM

## 2018-07-13 DIAGNOSIS — Z98.1 ARTHRODESIS STATUS: Chronic | ICD-10-CM

## 2018-07-13 DIAGNOSIS — C85.88 OTHER SPECIFIED TYPES OF NON-HODGKIN LYMPHOMA, LYMPH NODES OF MULTIPLE SITES: ICD-10-CM

## 2018-07-17 ENCOUNTER — APPOINTMENT (OUTPATIENT)
Dept: HEMATOLOGY ONCOLOGY | Facility: CLINIC | Age: 78
End: 2018-07-17
Payer: MEDICARE

## 2018-07-17 ENCOUNTER — RESULT REVIEW (OUTPATIENT)
Age: 78
End: 2018-07-17

## 2018-07-17 DIAGNOSIS — Z51.11 ENCOUNTER FOR ANTINEOPLASTIC CHEMOTHERAPY: ICD-10-CM

## 2018-07-17 DIAGNOSIS — Z85.46 PERSONAL HISTORY OF MALIGNANT NEOPLASM OF PROSTATE: ICD-10-CM

## 2018-07-17 LAB
BASOPHILS # BLD AUTO: 0 K/UL — SIGNIFICANT CHANGE UP (ref 0–0.2)
BASOPHILS NFR BLD AUTO: 0.6 % — SIGNIFICANT CHANGE UP (ref 0–2)
EOSINOPHIL # BLD AUTO: 0.1 K/UL — SIGNIFICANT CHANGE UP (ref 0–0.5)
EOSINOPHIL NFR BLD AUTO: 1.8 % — SIGNIFICANT CHANGE UP (ref 0–6)
HCT VFR BLD CALC: 37.4 % — LOW (ref 39–50)
HGB BLD-MCNC: 12.2 G/DL — LOW (ref 13–17)
LYMPHOCYTES # BLD AUTO: 1.1 K/UL — SIGNIFICANT CHANGE UP (ref 1–3.3)
LYMPHOCYTES # BLD AUTO: 18.5 % — SIGNIFICANT CHANGE UP (ref 13–44)
MCHC RBC-ENTMCNC: 30 PG — SIGNIFICANT CHANGE UP (ref 27–34)
MCHC RBC-ENTMCNC: 32.5 G/DL — SIGNIFICANT CHANGE UP (ref 32–36)
MCV RBC AUTO: 92.2 FL — SIGNIFICANT CHANGE UP (ref 80–100)
MONOCYTES # BLD AUTO: 0.6 K/UL — SIGNIFICANT CHANGE UP (ref 0–0.9)
MONOCYTES NFR BLD AUTO: 10.6 % — SIGNIFICANT CHANGE UP (ref 2–14)
NEUTROPHILS # BLD AUTO: 4 K/UL — SIGNIFICANT CHANGE UP (ref 1.8–7.4)
NEUTROPHILS NFR BLD AUTO: 68.4 % — SIGNIFICANT CHANGE UP (ref 43–77)
PLATELET # BLD AUTO: 153 K/UL — SIGNIFICANT CHANGE UP (ref 150–400)
RBC # BLD: 4.06 M/UL — LOW (ref 4.2–5.8)
RBC # FLD: 13.1 % — SIGNIFICANT CHANGE UP (ref 10.3–14.5)
WBC # BLD: 5.9 K/UL — SIGNIFICANT CHANGE UP (ref 3.8–10.5)
WBC # FLD AUTO: 5.9 K/UL — SIGNIFICANT CHANGE UP (ref 3.8–10.5)

## 2018-07-17 PROCEDURE — 99214 OFFICE O/P EST MOD 30 MIN: CPT

## 2018-07-18 LAB
ALBUMIN SERPL ELPH-MCNC: 3.7 G/DL
ALBUMIN SERPL ELPH-MCNC: 3.8 G/DL
ALP BLD-CCNC: 89 U/L
ALP BLD-CCNC: 96 U/L
ALT SERPL-CCNC: 10 U/L
ALT SERPL-CCNC: 5 U/L
ANION GAP SERPL CALC-SCNC: 11 MMOL/L
ANION GAP SERPL CALC-SCNC: 13 MMOL/L
AST SERPL-CCNC: 16 U/L
AST SERPL-CCNC: 18 U/L
B2 MICROGLOB SERPL-MCNC: 2.1 MG/L
B2 MICROGLOB SERPL-MCNC: 2.4 MG/L
BILIRUB SERPL-MCNC: 0.3 MG/DL
BILIRUB SERPL-MCNC: 0.3 MG/DL
BUN SERPL-MCNC: 14 MG/DL
BUN SERPL-MCNC: 15 MG/DL
CALCIUM SERPL-MCNC: 9 MG/DL
CALCIUM SERPL-MCNC: 9.6 MG/DL
CHLORIDE SERPL-SCNC: 98 MMOL/L
CHLORIDE SERPL-SCNC: 98 MMOL/L
CO2 SERPL-SCNC: 31 MMOL/L
CO2 SERPL-SCNC: 31 MMOL/L
CREAT SERPL-MCNC: 0.61 MG/DL
CREAT SERPL-MCNC: 0.63 MG/DL
DEPRECATED KAPPA LC FREE/LAMBDA SER: 1.04 RATIO
GLUCOSE SERPL-MCNC: 113 MG/DL
GLUCOSE SERPL-MCNC: 131 MG/DL
IGA SER QL IEP: 267 MG/DL
IGG SER QL IEP: 782 MG/DL
IGM SER QL IEP: 140 MG/DL
KAPPA LC CSF-MCNC: 1.79 MG/DL
KAPPA LC SERPL-MCNC: 1.87 MG/DL
LDH SERPL-CCNC: 136 U/L
LDH SERPL-CCNC: 147 U/L
POTASSIUM SERPL-SCNC: 4.4 MMOL/L
POTASSIUM SERPL-SCNC: 5.1 MMOL/L
PROT SERPL-MCNC: 6.1 G/DL
PROT SERPL-MCNC: 6.4 G/DL
SODIUM SERPL-SCNC: 140 MMOL/L
SODIUM SERPL-SCNC: 142 MMOL/L

## 2018-07-19 PROBLEM — I73.9 PERIPHERAL VASCULAR DISEASE, UNSPECIFIED: Chronic | Status: ACTIVE | Noted: 2017-02-21

## 2018-07-19 PROBLEM — M86.672 OTHER CHRONIC OSTEOMYELITIS, LEFT ANKLE AND FOOT: Chronic | Status: ACTIVE | Noted: 2017-02-21

## 2018-10-10 ENCOUNTER — OUTPATIENT (OUTPATIENT)
Dept: OUTPATIENT SERVICES | Facility: HOSPITAL | Age: 78
LOS: 1 days | Discharge: HOME | End: 2018-10-10

## 2018-10-10 DIAGNOSIS — Z98.89 OTHER SPECIFIED POSTPROCEDURAL STATES: Chronic | ICD-10-CM

## 2018-10-10 DIAGNOSIS — Z98.1 ARTHRODESIS STATUS: Chronic | ICD-10-CM

## 2018-10-10 DIAGNOSIS — C85.88 OTHER SPECIFIED TYPES OF NON-HODGKIN LYMPHOMA, LYMPH NODES OF MULTIPLE SITES: ICD-10-CM

## 2018-10-18 ENCOUNTER — RESULT REVIEW (OUTPATIENT)
Age: 78
End: 2018-10-18

## 2018-10-18 ENCOUNTER — APPOINTMENT (OUTPATIENT)
Dept: HEMATOLOGY ONCOLOGY | Facility: CLINIC | Age: 78
End: 2018-10-18
Payer: MEDICARE

## 2018-10-18 VITALS
TEMPERATURE: 97.9 F | BODY MASS INDEX: 21.52 KG/M2 | RESPIRATION RATE: 16 BRPM | DIASTOLIC BLOOD PRESSURE: 55 MMHG | HEART RATE: 71 BPM | WEIGHT: 163.14 LBS | SYSTOLIC BLOOD PRESSURE: 122 MMHG | OXYGEN SATURATION: 96 %

## 2018-10-18 DIAGNOSIS — C83.39 DIFFUSE LARGE B-CELL LYMPHOMA, EXTRANODAL AND SOLID ORGAN SITES: ICD-10-CM

## 2018-10-18 DIAGNOSIS — R19.7 DIARRHEA, UNSPECIFIED: ICD-10-CM

## 2018-10-18 DIAGNOSIS — I73.9 PERIPHERAL VASCULAR DISEASE, UNSPECIFIED: ICD-10-CM

## 2018-10-18 DIAGNOSIS — I48.2 CHRONIC ATRIAL FIBRILLATION: ICD-10-CM

## 2018-10-18 LAB
BASOPHILS # BLD AUTO: 0 K/UL — SIGNIFICANT CHANGE UP (ref 0–0.2)
BASOPHILS NFR BLD AUTO: 0.8 % — SIGNIFICANT CHANGE UP (ref 0–2)
EOSINOPHIL # BLD AUTO: 0.2 K/UL — SIGNIFICANT CHANGE UP (ref 0–0.5)
EOSINOPHIL NFR BLD AUTO: 2.9 % — SIGNIFICANT CHANGE UP (ref 0–6)
HCT VFR BLD CALC: 39.9 % — SIGNIFICANT CHANGE UP (ref 39–50)
HGB BLD-MCNC: 12.7 G/DL — LOW (ref 13–17)
LYMPHOCYTES # BLD AUTO: 1.1 K/UL — SIGNIFICANT CHANGE UP (ref 1–3.3)
LYMPHOCYTES # BLD AUTO: 20.6 % — SIGNIFICANT CHANGE UP (ref 13–44)
MCHC RBC-ENTMCNC: 29.5 PG — SIGNIFICANT CHANGE UP (ref 27–34)
MCHC RBC-ENTMCNC: 31.8 G/DL — LOW (ref 32–36)
MCV RBC AUTO: 92.7 FL — SIGNIFICANT CHANGE UP (ref 80–100)
MONOCYTES # BLD AUTO: 0.7 K/UL — SIGNIFICANT CHANGE UP (ref 0–0.9)
MONOCYTES NFR BLD AUTO: 12.8 % — SIGNIFICANT CHANGE UP (ref 2–14)
NEUTROPHILS # BLD AUTO: 3.4 K/UL — SIGNIFICANT CHANGE UP (ref 1.8–7.4)
NEUTROPHILS NFR BLD AUTO: 62.8 % — SIGNIFICANT CHANGE UP (ref 43–77)
PLATELET # BLD AUTO: 172 K/UL — SIGNIFICANT CHANGE UP (ref 150–400)
RBC # BLD: 4.3 M/UL — SIGNIFICANT CHANGE UP (ref 4.2–5.8)
RBC # FLD: 12.8 % — SIGNIFICANT CHANGE UP (ref 10.3–14.5)
WBC # BLD: 5.4 K/UL — SIGNIFICANT CHANGE UP (ref 3.8–10.5)
WBC # FLD AUTO: 5.4 K/UL — SIGNIFICANT CHANGE UP (ref 3.8–10.5)

## 2018-10-18 PROCEDURE — 99215 OFFICE O/P EST HI 40 MIN: CPT

## 2018-10-21 PROBLEM — R19.7 DIARRHEA: Status: ACTIVE | Noted: 2018-01-03

## 2018-10-21 PROBLEM — I48.2 CHRONIC ATRIAL FIBRILLATION: Status: ACTIVE | Noted: 2017-02-24

## 2018-10-21 PROBLEM — Z51.11 ENCOUNTER FOR CHEMOTHERAPY MANAGEMENT: Status: ACTIVE | Noted: 2017-03-13

## 2018-10-21 PROBLEM — C83.39 DIFFUSE LARGE B-CELL LYMPHOMA OF EXTRANODAL SITE: Status: ACTIVE | Noted: 2018-10-21

## 2018-10-21 LAB
ALBUMIN SERPL ELPH-MCNC: 3.8 G/DL
ALP BLD-CCNC: 84 U/L
ALT SERPL-CCNC: 10 U/L
ANION GAP SERPL CALC-SCNC: 10 MMOL/L
AST SERPL-CCNC: 14 U/L
B2 MICROGLOB SERPL-MCNC: 2 MG/L
BILIRUB SERPL-MCNC: 0.3 MG/DL
BUN SERPL-MCNC: 10 MG/DL
CALCIUM SERPL-MCNC: 9.2 MG/DL
CHLORIDE SERPL-SCNC: 96 MMOL/L
CO2 SERPL-SCNC: 33 MMOL/L
CREAT SERPL-MCNC: 0.48 MG/DL
DEPRECATED KAPPA LC FREE/LAMBDA SER: 0.94 RATIO
GLUCOSE SERPL-MCNC: 134 MG/DL
IGA SER QL IEP: 303 MG/DL
IGG SER QL IEP: 838 MG/DL
IGM SER QL IEP: 150 MG/DL
KAPPA LC CSF-MCNC: 1.83 MG/DL
KAPPA LC SERPL-MCNC: 1.72 MG/DL
LDH SERPL-CCNC: 144 U/L
POTASSIUM SERPL-SCNC: 4.5 MMOL/L
PROT SERPL-MCNC: 6.1 G/DL
PSA SERPL-MCNC: 14.84 NG/ML
SODIUM SERPL-SCNC: 139 MMOL/L

## 2018-10-30 ENCOUNTER — APPOINTMENT (OUTPATIENT)
Dept: UROLOGY | Facility: CLINIC | Age: 78
End: 2018-10-30
Payer: MEDICARE

## 2018-10-30 PROCEDURE — 99214 OFFICE O/P EST MOD 30 MIN: CPT

## 2018-11-05 ENCOUNTER — APPOINTMENT (OUTPATIENT)
Dept: FAMILY MEDICINE | Facility: CLINIC | Age: 78
End: 2018-11-05
Payer: MEDICARE

## 2018-11-05 DIAGNOSIS — Z23 ENCOUNTER FOR IMMUNIZATION: ICD-10-CM

## 2018-11-05 PROCEDURE — G0008: CPT

## 2018-11-05 PROCEDURE — 90662 IIV NO PRSV INCREASED AG IM: CPT

## 2018-11-05 PROCEDURE — G0009: CPT

## 2018-11-05 PROCEDURE — 90732 PPSV23 VACC 2 YRS+ SUBQ/IM: CPT

## 2018-11-07 ENCOUNTER — FORM ENCOUNTER (OUTPATIENT)
Age: 78
End: 2018-11-07

## 2018-11-08 ENCOUNTER — APPOINTMENT (OUTPATIENT)
Dept: NUCLEAR MEDICINE | Facility: IMAGING CENTER | Age: 78
End: 2018-11-08
Payer: MEDICARE

## 2018-11-08 ENCOUNTER — OUTPATIENT (OUTPATIENT)
Dept: OUTPATIENT SERVICES | Facility: HOSPITAL | Age: 78
LOS: 1 days | End: 2018-11-08
Payer: MEDICARE

## 2018-11-08 DIAGNOSIS — Z98.1 ARTHRODESIS STATUS: Chronic | ICD-10-CM

## 2018-11-08 DIAGNOSIS — Z98.89 OTHER SPECIFIED POSTPROCEDURAL STATES: Chronic | ICD-10-CM

## 2018-11-08 DIAGNOSIS — C61 MALIGNANT NEOPLASM OF PROSTATE: ICD-10-CM

## 2018-11-08 PROCEDURE — A9561: CPT

## 2018-11-08 PROCEDURE — 78306 BONE IMAGING WHOLE BODY: CPT | Mod: 26

## 2018-11-08 PROCEDURE — 78306 BONE IMAGING WHOLE BODY: CPT

## 2018-11-13 ENCOUNTER — FORM ENCOUNTER (OUTPATIENT)
Age: 78
End: 2018-11-13

## 2018-11-14 ENCOUNTER — APPOINTMENT (OUTPATIENT)
Dept: NUCLEAR MEDICINE | Facility: CLINIC | Age: 78
End: 2018-11-14

## 2018-11-14 ENCOUNTER — OUTPATIENT (OUTPATIENT)
Dept: OUTPATIENT SERVICES | Facility: HOSPITAL | Age: 78
LOS: 1 days | End: 2018-11-14
Payer: MEDICARE

## 2018-11-14 DIAGNOSIS — Z98.89 OTHER SPECIFIED POSTPROCEDURAL STATES: Chronic | ICD-10-CM

## 2018-11-14 DIAGNOSIS — Z98.1 ARTHRODESIS STATUS: Chronic | ICD-10-CM

## 2018-11-14 DIAGNOSIS — Z00.8 ENCOUNTER FOR OTHER GENERAL EXAMINATION: ICD-10-CM

## 2018-11-14 PROCEDURE — 78815 PET IMAGE W/CT SKULL-THIGH: CPT | Mod: 26,PS,KX

## 2018-11-14 PROCEDURE — A9552: CPT

## 2018-11-14 PROCEDURE — 78815 PET IMAGE W/CT SKULL-THIGH: CPT

## 2018-12-12 ENCOUNTER — CHART COPY (OUTPATIENT)
Age: 78
End: 2018-12-12

## 2018-12-13 ENCOUNTER — OTHER (OUTPATIENT)
Age: 78
End: 2018-12-13

## 2018-12-17 ENCOUNTER — FORM ENCOUNTER (OUTPATIENT)
Age: 78
End: 2018-12-17

## 2018-12-18 ENCOUNTER — APPOINTMENT (OUTPATIENT)
Dept: MRI IMAGING | Facility: CLINIC | Age: 78
End: 2018-12-18
Payer: MEDICARE

## 2018-12-18 ENCOUNTER — OUTPATIENT (OUTPATIENT)
Dept: OUTPATIENT SERVICES | Facility: HOSPITAL | Age: 78
LOS: 1 days | End: 2018-12-18
Payer: MEDICARE

## 2018-12-18 DIAGNOSIS — Z98.1 ARTHRODESIS STATUS: Chronic | ICD-10-CM

## 2018-12-18 DIAGNOSIS — Z98.89 OTHER SPECIFIED POSTPROCEDURAL STATES: Chronic | ICD-10-CM

## 2018-12-18 DIAGNOSIS — Z00.8 ENCOUNTER FOR OTHER GENERAL EXAMINATION: ICD-10-CM

## 2018-12-18 PROCEDURE — 72197 MRI PELVIS W/O & W/DYE: CPT

## 2018-12-18 PROCEDURE — 72197 MRI PELVIS W/O & W/DYE: CPT | Mod: 26

## 2018-12-18 PROCEDURE — 82565 ASSAY OF CREATININE: CPT

## 2018-12-18 PROCEDURE — A9585: CPT

## 2019-01-11 ENCOUNTER — APPOINTMENT (OUTPATIENT)
Dept: FAMILY MEDICINE | Facility: CLINIC | Age: 79
End: 2019-01-11
Payer: MEDICARE

## 2019-01-11 ENCOUNTER — OUTPATIENT (OUTPATIENT)
Dept: OUTPATIENT SERVICES | Facility: HOSPITAL | Age: 79
LOS: 1 days | Discharge: HOME | End: 2019-01-11

## 2019-01-11 VITALS
OXYGEN SATURATION: 90 % | WEIGHT: 160 LBS | HEIGHT: 73 IN | HEART RATE: 92 BPM | BODY MASS INDEX: 21.2 KG/M2 | DIASTOLIC BLOOD PRESSURE: 60 MMHG | SYSTOLIC BLOOD PRESSURE: 120 MMHG | TEMPERATURE: 98.2 F

## 2019-01-11 DIAGNOSIS — C85.88 OTHER SPECIFIED TYPES OF NON-HODGKIN LYMPHOMA, LYMPH NODES OF MULTIPLE SITES: ICD-10-CM

## 2019-01-11 DIAGNOSIS — L98.9 DISORDER OF THE SKIN AND SUBCUTANEOUS TISSUE, UNSPECIFIED: ICD-10-CM

## 2019-01-11 DIAGNOSIS — Z98.1 ARTHRODESIS STATUS: Chronic | ICD-10-CM

## 2019-01-11 DIAGNOSIS — Z98.89 OTHER SPECIFIED POSTPROCEDURAL STATES: Chronic | ICD-10-CM

## 2019-01-11 PROCEDURE — 99214 OFFICE O/P EST MOD 30 MIN: CPT

## 2019-01-11 RX ORDER — ALBUTEROL SULFATE 90 UG/1
108 (90 BASE) AEROSOL, METERED RESPIRATORY (INHALATION)
Qty: 1 | Refills: 0 | Status: ACTIVE | COMMUNITY
Start: 2019-01-11 | End: 1900-01-01

## 2019-01-11 NOTE — HISTORY OF PRESENT ILLNESS
[Moderate] : moderate [___ Days ago] : [unfilled] days ago [Sudden] : suddenly [Paroxysmal] : paroxysmal [Congestion] : congestion [Cough] : cough [Sore Throat] : sore throat [Shortness Of Breath] : shortness of breath [Fatigue] : fatigue [Headache] : headache [Rest] : rest [At Night] : at night [Worsening] : worsening [Wheezing] : no wheezing [Chills] : no chills [Anorexia] : no anorexia [Earache] : no earache [Fever] : no fever [FreeTextEntry2] : yellow green mucus [FreeTextEntry8] : no sick contacts.

## 2019-01-11 NOTE — REVIEW OF SYSTEMS
[Fatigue] : fatigue [Sore Throat] : sore throat [Shortness Of Breath] : shortness of breath [Cough] : cough [Negative] : Heme/Lymph [Fever] : no fever [Chills] : no chills [Earache] : no earache [Wheezing] : no wheezing

## 2019-01-11 NOTE — HEALTH RISK ASSESSMENT
[No falls in past year] : Patient reported no falls in the past year [0] : 2) Feeling down, depressed, or hopeless: Not at all (0) [] : No [[id number]] : 0

## 2019-01-11 NOTE — PLAN
[FreeTextEntry1] : fluids, cough medicine.\par  antibiotic.\par CXR chest.\par  derm. referral. \par fluids.

## 2019-01-11 NOTE — PHYSICAL EXAM
[No Acute Distress] : no acute distress [Well Nourished] : well nourished [Well Developed] : well developed [Well-Appearing] : well-appearing [Normal Sclera/Conjunctiva] : normal sclera/conjunctiva [PERRL] : pupils equal round and reactive to light [EOMI] : extraocular movements intact [Normal Outer Ear/Nose] : the outer ears and nose were normal in appearance [No JVD] : no jugular venous distention [Supple] : supple [No Lymphadenopathy] : no lymphadenopathy [Thyroid Normal, No Nodules] : the thyroid was normal and there were no nodules present [No Respiratory Distress] : no respiratory distress  [No Accessory Muscle Use] : no accessory muscle use [Normal Rate] : normal rate  [Regular Rhythm] : with a regular rhythm [Normal S1, S2] : normal S1 and S2 [No Murmur] : no murmur heard [No Carotid Bruits] : no carotid bruits [No Abdominal Bruit] : a ~M bruit was not heard ~T in the abdomen [No Varicosities] : no varicosities [Pedal Pulses Present] : the pedal pulses are present [No Edema] : there was no peripheral edema [No Extremity Clubbing/Cyanosis] : no extremity clubbing/cyanosis [No Palpable Aorta] : no palpable aorta [Soft] : abdomen soft [Non Tender] : non-tender [Non-distended] : non-distended [No Masses] : no abdominal mass palpated [No HSM] : no HSM [Normal Bowel Sounds] : normal bowel sounds [Normal Posterior Cervical Nodes] : no posterior cervical lymphadenopathy [Normal Anterior Cervical Nodes] : no anterior cervical lymphadenopathy [No CVA Tenderness] : no CVA  tenderness [No Spinal Tenderness] : no spinal tenderness [No Joint Swelling] : no joint swelling [Grossly Normal Strength/Tone] : grossly normal strength/tone [Normal Gait] : normal gait [Coordination Grossly Intact] : coordination grossly intact [No Focal Deficits] : no focal deficits [Deep Tendon Reflexes (DTR)] : deep tendon reflexes were 2+ and symmetric [Normal Affect] : the affect was normal [Normal Insight/Judgement] : insight and judgment were intact [de-identified] : throat red [de-identified] : wheezing at bases of lungs [de-identified] : SK on sides of thorax, precancerous lesions on face

## 2019-01-15 ENCOUNTER — RESULT REVIEW (OUTPATIENT)
Age: 79
End: 2019-01-15

## 2019-01-15 ENCOUNTER — APPOINTMENT (OUTPATIENT)
Dept: HEMATOLOGY ONCOLOGY | Facility: CLINIC | Age: 79
End: 2019-01-15
Payer: MEDICARE

## 2019-01-15 VITALS
HEART RATE: 79 BPM | DIASTOLIC BLOOD PRESSURE: 75 MMHG | SYSTOLIC BLOOD PRESSURE: 125 MMHG | WEIGHT: 162.04 LBS | RESPIRATION RATE: 17 BRPM | OXYGEN SATURATION: 98 % | BODY MASS INDEX: 21.38 KG/M2 | TEMPERATURE: 98.8 F

## 2019-01-15 DIAGNOSIS — M86.60 OTHER CHRONIC OSTEOMYELITIS, UNSPECIFIED SITE: ICD-10-CM

## 2019-01-15 DIAGNOSIS — I87.2 VENOUS INSUFFICIENCY (CHRONIC) (PERIPHERAL): ICD-10-CM

## 2019-01-15 DIAGNOSIS — Z87.09 PERSONAL HISTORY OF OTHER DISEASES OF THE RESPIRATORY SYSTEM: ICD-10-CM

## 2019-01-15 LAB
BASOPHILS # BLD AUTO: 0 K/UL — SIGNIFICANT CHANGE UP (ref 0–0.2)
BASOPHILS NFR BLD AUTO: 1 % — SIGNIFICANT CHANGE UP (ref 0–2)
EOSINOPHIL # BLD AUTO: 0.2 K/UL — SIGNIFICANT CHANGE UP (ref 0–0.5)
EOSINOPHIL NFR BLD AUTO: 3 % — SIGNIFICANT CHANGE UP (ref 0–6)
HCT VFR BLD CALC: 37.8 % — LOW (ref 39–50)
HGB BLD-MCNC: 12.4 G/DL — LOW (ref 13–17)
LYMPHOCYTES # BLD AUTO: 1.1 K/UL — SIGNIFICANT CHANGE UP (ref 1–3.3)
LYMPHOCYTES # BLD AUTO: 15 % — SIGNIFICANT CHANGE UP (ref 13–44)
MCHC RBC-ENTMCNC: 29.7 PG — SIGNIFICANT CHANGE UP (ref 27–34)
MCHC RBC-ENTMCNC: 32.7 G/DL — SIGNIFICANT CHANGE UP (ref 32–36)
MCV RBC AUTO: 90.6 FL — SIGNIFICANT CHANGE UP (ref 80–100)
MONOCYTES # BLD AUTO: 1.4 K/UL — HIGH (ref 0–0.9)
MONOCYTES NFR BLD AUTO: 17 % — HIGH (ref 2–14)
NEUTROPHILS # BLD AUTO: 4.5 K/UL — SIGNIFICANT CHANGE UP (ref 1.8–7.4)
NEUTROPHILS NFR BLD AUTO: 64 % — SIGNIFICANT CHANGE UP (ref 43–77)
PLAT MORPH BLD: NORMAL — SIGNIFICANT CHANGE UP
PLATELET # BLD AUTO: 208 K/UL — SIGNIFICANT CHANGE UP (ref 150–400)
RBC # BLD: 4.17 M/UL — LOW (ref 4.2–5.8)
RBC # FLD: 12.8 % — SIGNIFICANT CHANGE UP (ref 10.3–14.5)
RBC BLD AUTO: SIGNIFICANT CHANGE UP
WBC # BLD: 7.2 K/UL — SIGNIFICANT CHANGE UP (ref 3.8–10.5)
WBC # FLD AUTO: 7.2 K/UL — SIGNIFICANT CHANGE UP (ref 3.8–10.5)

## 2019-01-15 PROCEDURE — 99215 OFFICE O/P EST HI 40 MIN: CPT

## 2019-01-15 NOTE — ASSESSMENT
[Palliative] : Goals of care discussed with patient: Palliative [Palliative Care Plan] : not applicable at this time [FreeTextEntry1] : DLBCL -Major and improving response to chemoradiotherapy, exam negative, inconclusive pleural findings, poss inflammatory\par Prostate Ca- disease progression by PSA and imaging, to see Dr. Garg in about two wks\par Peripheral vascular dz- considering vascular evaluation\par PAF- resolved, Cardiology not insistent on continuing eliquis and cartia.  Pt wanted to be off anti-coagulation and was insistent that cartia was affecting wound healing.  It was thought that the PAF may have been set off after chemotherapy.  No recurrence x many months.  No cardiac c/o.\par Cough, URI - consider imaging only if persists.  Seeing his pulmonary MD this week.\par Chronic osteo- off minocycline \par weight loss - not explained, has stabilized in last few months.  TSH sl up, T4, T3 nl last checked.\par RV 4 mos\par  \par  \par  \par Check CBC, CMP, LDH, B2M\par RV 2 mos \par Patient seen with Dr. Swenson

## 2019-01-15 NOTE — HISTORY OF PRESENT ILLNESS
[Disease:__________________________] : Disease: [unfilled] [Treatment Protocol] : Treatment Protocol [de-identified] : Mr. 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  Nodules were painful.  The right thigh became markedly swollen ; has had a (-) Doppler study.  He also has had MRI of femur.  He was staged with PET/CT and began therapy with R-COEP Jan., 2017..\par \par History of osteomyelitis -had portions of C spine, lower spine, heel, with MRS, ? around 2010.  On long suppressive minocycline, for heel ulcer/osteo,  followed by Dr. Pena, ID.\par Sees pulmonologist: chronic but stable pulmonary changes ? smoking related; sees Dr. Guru Vallecillo.  [de-identified] : MATHEW [de-identified] : DLBCL, ABC type [FreeTextEntry1] : s/p R-COEP  and  Neulasta x 6 completed  4/19/17        s/p RT 3060 cGy completed 7/25/17 [de-identified] : . \par DLBCL- completed R-COEP x 6 cycles 4/2017.  Interim PET/CT [place] 4. Then received 3060 cGy RT in 16 fractions 6/17-7/17. w/o grade 3 or higher toxicity.   PET/CT 10/20/17 decrease in leg tumors but persistent FDG+ > liver ([place]ville 4), may have been done too soon post RT.  Most recent PET/CT 1/25/18 shows further regression in leg tumors, poorly delineated nodularity SUV down to 2.1 from 4.1 (De[place]ville 3). Last PET 5/15/2018 showed increased conspicuity of small FDG focus within anteromedial distal right thigh, new FDG avidity LL lung and new cecal FDG avidity. Had (-) colonoscopy about 1 yr pre scan.\par Does not have increase in swelling or pain in the right thigh.\par PET/CT 11/14/2018: decrease FDG uptake in distal right thigh, indeterminate pleural uptake with sl incr FDG activity, decr uptake in areas of SQ infiltration in abd wall, new FDG+ focus left prostate.\par MRI prostate 12/18/2018- PIRADS 5, poss PIERRE\par Constitutional: Reports occas fatigue. stable appetite but has had a 40 pound weight loss in last two years, of which 13 pounds have been since 8/2017.  Weight 74 kg today, same as 5 months ago. He denies any recent infections, fever/chills, night sweats or rashes. \par Skin dry, with residual hyperpigmented, hyperkeratotic macules on leg.  He denies pain. \par Chronic osteomyelitis- off chronic minocycline with no sxs recurrence\par recent URI, cough, saw his MD and placed on nebs and levaquin.  Afebrile.\par Able to ambulate, uses walker as needed\par Elevated PSA- had renormalized on Zoladex, PSA 14,4 on 10/18/18, 0.85 1/29/18 and 3.18 5/8/2018.  Prostate bx [place] 7.  MRI prostate ? PIERRE\par PAF- off Eliquis and Cartia per cardiology..  No cardiac c/o.

## 2019-01-15 NOTE — REVIEW OF SYSTEMS
[Fatigue] : fatigue [Constipation] : constipation [Diarrhea] : diarrhea [Negative] : Gastrointestinal [FreeTextEntry2] : no signif wt change last 7-8 mos [FreeTextEntry3] : macular degeneration  right eye  left eye gets injections every 8  weeks, vision stable [FreeTextEntry8] : PSA rising as above [de-identified] : neuropathy feet hands to elbow

## 2019-01-15 NOTE — PHYSICAL EXAM
[Restricted in physically strenuous activity but ambulatory and able to carry out work of a light or sedentary nature] : Status 1- Restricted in physically strenuous activity but ambulatory and able to carry out work of a light or sedentary nature, e.g., light house work, office work [Thin] : thin [Normal] : affect appropriate [de-identified] : LE venous stasis changes L > R [de-identified] : no CVAT [de-identified] : macular, oval areas of hyperpigmentation prox RLE post RT changes [de-identified] : right leg no evidence of active disease, flat pigmented macules remain

## 2019-01-15 NOTE — CONSULT LETTER
[Dear  ___] : Dear  [unfilled], [Courtesy Letter:] : I had the pleasure of seeing your patient, [unfilled], in my office today. [Please see my note below.] : Please see my note below. [Consult Closing:] : Thank you very much for allowing me to participate in the care of this patient.  If you have any questions, please do not hesitate to contact me. [Sincerely,] : Sincerely, [DrHenry  ___] : Dr. NOWAK [FreeTextEntry2] : Hever Vieyra M.D. [FreeTextEntry3] : Grabiel Swenson M.D., [facility]

## 2019-01-28 ENCOUNTER — OUTPATIENT (OUTPATIENT)
Dept: OUTPATIENT SERVICES | Facility: HOSPITAL | Age: 79
LOS: 1 days | Discharge: HOME | End: 2019-01-28
Payer: MEDICARE

## 2019-01-28 ENCOUNTER — APPOINTMENT (OUTPATIENT)
Dept: RADIATION ONCOLOGY | Facility: CLINIC | Age: 79
End: 2019-01-28
Payer: MEDICARE

## 2019-01-28 VITALS
OXYGEN SATURATION: 94 % | DIASTOLIC BLOOD PRESSURE: 67 MMHG | RESPIRATION RATE: 17 BRPM | HEART RATE: 79 BPM | BODY MASS INDEX: 21.49 KG/M2 | WEIGHT: 162.15 LBS | HEIGHT: 73 IN | SYSTOLIC BLOOD PRESSURE: 116 MMHG | TEMPERATURE: 97.9 F

## 2019-01-28 DIAGNOSIS — Z98.89 OTHER SPECIFIED POSTPROCEDURAL STATES: Chronic | ICD-10-CM

## 2019-01-28 DIAGNOSIS — Z98.1 ARTHRODESIS STATUS: Chronic | ICD-10-CM

## 2019-01-28 PROCEDURE — 99215 OFFICE O/P EST HI 40 MIN: CPT | Mod: 25

## 2019-01-28 PROCEDURE — 77263 THER RADIOLOGY TX PLNG CPLX: CPT

## 2019-01-28 NOTE — REVIEW OF SYSTEMS
[Nocturia] : nocturia [Urinary Frequency] : urinary frequency [IPSS Score (0-40): ___] : IPSS score: [unfilled] [EPIC-CP Score (0-60): ___] : EPIC-CP score: [unfilled] [Joint Pain] : joint pain [Muscle Pain] : muscle pain [Disturbance Of Gait] : gait disturbance [Depression] : depression [Negative] : Neurological [Hematuria: Grade 0] : Hematuria: Grade 0 [Urinary Incontinence: Grade 0] : Urinary Incontinence: Grade 0  [Urinary Retention: Grade 0] : Urinary Retention: Grade 0 [Urinary Tract Pain: Grade 0] : Urinary Tract Pain: Grade 0 [Urinary Urgency: Grade 1 - Present] : Urinary Urgency: Grade 1 - Present [Urinary Frequency: Grade 1 - Present] : Urinary Frequency: Grade 1 - Present [Ejaculation Disorder: Grade 1 - Diminished ejaculation] : Ejaculation Disorder: Grade 1 - Diminished ejaculation  [Erectile Dysfunction: Grade 1 - Decrease in erectile function (frequency or rigidity of erections) but intervention not indicated (e.g., medication or use of mechanical device, penile pump)] : Erectile Dysfunction: Grade 1 - Decrease in erectile function (frequency or rigidity of erections) but intervention not indicated (e.g., medication or use of mechanical device, penile pump) [FreeTextEntry9] : ambulate with walker

## 2019-01-28 NOTE — VITALS
[70: Cares for self; unalbe to carry on normal activity or do active work.] : 70: Cares for self; unable to carry on normal activity or do active work. [ECOG Performance Status: 2 - Ambulatory and capable of all self care but unable to carry out any work activities] : Performance Status: 2 - Ambulatory and capable of all self care but unable to carry out any work activities. Up and about more than 50% of waking hours [Maximal Pain Intensity: 2/10] : 2/10 [Least Pain Intensity: 0/10] : 0/10 [NoTreatment Scheduled] : no treatment scheduled

## 2019-01-28 NOTE — HISTORY OF PRESENT ILLNESS
[FreeTextEntry1] : Mr. Thomas Richard is a 78 year-old-male with prostate cancer. He was initially diagnosed in 2008, with Israel 6 and was on active surveillance.  Subsequently he was diagnosed with lymphoma in 2010 and treated at [place] in the [facility].  His therapy was delayed due to infectious complications of orthopedic surgery. \par \par In the interim he was treated with ADT for some period of time (unclear), initially started to combine with radiation therapy but now turned into intermittent (?). His last injection 2014. \par \par In July 2016 his PSA increased to  10.71 ng/ml, which precipitated an MRI, pirads 5 noted two lesions left PZ confined to prostate and no enlarged LNs. A repeat MRI of prostate done on 12/18/18 indicated prostate volume of 36cc and 14 x 20 x 14 mm left, anterior posterior, midgland to apex, peripheral zone lesion. Lesion 2 on the prior exam in the left midgland now blends in with the index lesion and will be considered one lesion for the purpose of this report. Bulging and irregularity of the adjacent capsule is suspicious for PIERRE. Left neurovascular bundle abutment. PIRADS 5. No seminal vesicle invasion or pelvic lymphadenopathy noted. \par \par  Fusion plus 12 core biopsy done on 10/12/16 showed 9 out of 16 cores positive for cancer with Israel score of 4+3=7 involving 30% - 95% of the tissue.\par \par He had a recurrence of lymphoma, he is s/p Mohs therapy, follow up with PET scan. His recent PSA is 14.84 ng/ml done on 10/18/18.\par \par He present here today for consideration for radiation therapy. Patient report nocturia 2-4 times per night, frequency and occasional urgency. Has occasional constipation and he is not sexually active. Has no desire since he lost his wife.\par

## 2019-02-14 ENCOUNTER — APPOINTMENT (OUTPATIENT)
Dept: FAMILY MEDICINE | Facility: CLINIC | Age: 79
End: 2019-02-14
Payer: MEDICARE

## 2019-02-14 VITALS
DIASTOLIC BLOOD PRESSURE: 70 MMHG | RESPIRATION RATE: 16 BRPM | BODY MASS INDEX: 21.87 KG/M2 | WEIGHT: 165 LBS | OXYGEN SATURATION: 95 % | TEMPERATURE: 98.4 F | HEIGHT: 73 IN | HEART RATE: 77 BPM | SYSTOLIC BLOOD PRESSURE: 110 MMHG

## 2019-02-14 DIAGNOSIS — R63.4 ABNORMAL WEIGHT LOSS: ICD-10-CM

## 2019-02-14 DIAGNOSIS — Z87.2 PERSONAL HISTORY OF DISEASES OF THE SKIN AND SUBCUTANEOUS TISSUE: ICD-10-CM

## 2019-02-14 DIAGNOSIS — R05 COUGH: ICD-10-CM

## 2019-02-14 DIAGNOSIS — R13.10 DYSPHAGIA, UNSPECIFIED: ICD-10-CM

## 2019-02-14 DIAGNOSIS — R04.2 HEMOPTYSIS: ICD-10-CM

## 2019-02-14 DIAGNOSIS — K21.9 GASTRO-ESOPHAGEAL REFLUX DISEASE W/OUT ESOPHAGITIS: ICD-10-CM

## 2019-02-14 PROCEDURE — 99215 OFFICE O/P EST HI 40 MIN: CPT

## 2019-02-14 RX ORDER — LEVOFLOXACIN 500 MG/1
500 TABLET, FILM COATED ORAL DAILY
Qty: 7 | Refills: 0 | Status: DISCONTINUED | COMMUNITY
Start: 2019-01-11 | End: 2019-02-14

## 2019-02-14 RX ORDER — PROMETHAZINE HYDROCHLORIDE AND DEXTROMETHORPHAN HYDROBROMIDE ORAL SOLUTION 15; 6.25 MG/5ML; MG/5ML
6.25-15 SOLUTION ORAL
Qty: 100 | Refills: 0 | Status: DISCONTINUED | COMMUNITY
Start: 2019-01-11 | End: 2019-02-14

## 2019-02-14 NOTE — HEALTH RISK ASSESSMENT
[No falls in past year] : Patient reported no falls in the past year [1] : 2) Feeling down, depressed, or hopeless for several days (1) [] : No [[id number]] : 2

## 2019-02-14 NOTE — PHYSICAL EXAM
[No Acute Distress] : no acute distress [Cachectic] : cachexia was observed [PERRL] : pupils equal round and reactive to light [EOMI] : extraocular movements intact [Normal Outer Ear/Nose] : the outer ears and nose were normal in appearance [Normal Oropharynx] : the oropharynx was normal [Supple] : supple [No Respiratory Distress] : no respiratory distress  [Normal Rate] : normal rate  [Normal S1, S2] : normal S1 and S2 [No Edema] : there was no peripheral edema [Soft] : abdomen soft [Non Tender] : non-tender [Normal Anterior Cervical Nodes] : no anterior cervical lymphadenopathy [de-identified] : appears very thin [de-identified] : inflammed nasal turbinate [de-identified] : coarse breath south L>R

## 2019-02-14 NOTE — HISTORY OF PRESENT ILLNESS
[FreeTextEntry8] : Pt is a 77 yo male presents  felt a small amount of blood mixed with mucus which he spit out last night. This morning noticed a small amount of blood in tissue. now does not appear to have it. Pt has significant PMHx of B-cell lymphoma and presently has prostate cancer. Pt also states that he has had significant weight loss, 35 lbs over 18 months. however has good appetite. Pt expressed interest in seeing a nutritionist.\par Pt states that last night also has issue with swallowing a pill and has sensation of feeling pills getting stuck in throat. Pt states had an EGD done in the past, and was advised to see GI for repeat EGD. Pt also states he has chronic gerd.

## 2019-02-14 NOTE — REVIEW OF SYSTEMS
[Fatigue] : fatigue [Hearing Loss] : hearing loss [Postnasal Drip] : postnasal drip [Nasal Discharge] : nasal discharge [Cough] : cough [Heartburn] : heartburn [Fever] : no fever [Chills] : no chills [Discharge] : no discharge [Pain] : no pain [Vision Problems] : no vision problems [Earache] : no earache [Nosebleeds] : no nosebleeds [Sore Throat] : no sore throat [Hoarseness] : no hoarseness [Chest Pain] : no chest pain [Palpitations] : no palpitations [Shortness Of Breath] : no shortness of breath [Wheezing] : no wheezing [Dyspnea on Exertion] : not dyspnea on exertion [Abdominal Pain] : no abdominal pain [Nausea] : no nausea [Constipation] : no constipation [Diarrhea] : no diarrhea [Vomiting] : no vomiting [Dysuria] : no dysuria [Frequency] : no frequency [Back Pain] : no back pain [Itching] : no itching [Skin Rash] : no skin rash [Headache] : no headache [Suicidal] : not suicidal [Depression] : no depression [FreeTextEntry4] : sensation of pills getting stuck in throat

## 2019-03-06 ENCOUNTER — APPOINTMENT (OUTPATIENT)
Age: 79
End: 2019-03-06
Payer: MEDICARE

## 2019-03-06 VITALS
HEIGHT: 73 IN | RESPIRATION RATE: 16 BRPM | OXYGEN SATURATION: 98 % | DIASTOLIC BLOOD PRESSURE: 70 MMHG | WEIGHT: 165 LBS | SYSTOLIC BLOOD PRESSURE: 100 MMHG | BODY MASS INDEX: 21.87 KG/M2 | HEART RATE: 78 BPM

## 2019-03-06 DIAGNOSIS — M54.9 DORSALGIA, UNSPECIFIED: ICD-10-CM

## 2019-03-06 DIAGNOSIS — R29.898 OTHER SYMPTOMS AND SIGNS INVOLVING THE MUSCULOSKELETAL SYSTEM: ICD-10-CM

## 2019-03-06 PROCEDURE — 99215 OFFICE O/P EST HI 40 MIN: CPT | Mod: 25

## 2019-03-06 PROCEDURE — 36415 COLL VENOUS BLD VENIPUNCTURE: CPT

## 2019-03-06 RX ORDER — MELOXICAM 7.5 MG/1
7.5 TABLET ORAL
Qty: 60 | Refills: 3 | Status: ACTIVE | COMMUNITY
Start: 2019-03-06 | End: 1900-01-01

## 2019-03-06 NOTE — COUNSELING
[Behavioral health counseling provided] : behavioral health  [Fall prevention counseling provided] : fall prevention  [Sleep ___ hours/day] : Sleep [unfilled] hours/day [Engage in a relaxing activity] : Engage in a relaxing activity [Plan in advance] : Plan in advance [Adequate lighting] : Adequate lighting [No throw rugs] : No throw rugs [Use proper foot wear] : Use proper foot wear [Use recommended devices] : Use recommended devices [None] : None [Good understanding] : Patient has a good understanding of lifestyle changes and the steps needed to achieve self management goals

## 2019-03-07 LAB
T4 FREE SERPL-MCNC: 1.4 NG/DL
TSH SERPL-ACNC: 2.77 UIU/ML

## 2019-03-08 ENCOUNTER — OUTPATIENT (OUTPATIENT)
Dept: OUTPATIENT SERVICES | Facility: HOSPITAL | Age: 79
LOS: 1 days | End: 2019-03-08

## 2019-03-08 VITALS
WEIGHT: 162.04 LBS | RESPIRATION RATE: 16 BRPM | DIASTOLIC BLOOD PRESSURE: 76 MMHG | SYSTOLIC BLOOD PRESSURE: 124 MMHG | TEMPERATURE: 99 F | OXYGEN SATURATION: 97 % | HEART RATE: 86 BPM | HEIGHT: 72 IN

## 2019-03-08 DIAGNOSIS — C61 MALIGNANT NEOPLASM OF PROSTATE: ICD-10-CM

## 2019-03-08 DIAGNOSIS — Z98.89 OTHER SPECIFIED POSTPROCEDURAL STATES: Chronic | ICD-10-CM

## 2019-03-08 DIAGNOSIS — J84.9 INTERSTITIAL PULMONARY DISEASE, UNSPECIFIED: ICD-10-CM

## 2019-03-08 DIAGNOSIS — Z98.1 ARTHRODESIS STATUS: Chronic | ICD-10-CM

## 2019-03-08 DIAGNOSIS — R06.83 SNORING: ICD-10-CM

## 2019-03-08 LAB
ALBUMIN SERPL ELPH-MCNC: 3.4 G/DL — SIGNIFICANT CHANGE UP (ref 3.3–5)
ALP SERPL-CCNC: 96 U/L — SIGNIFICANT CHANGE UP (ref 40–120)
ALT FLD-CCNC: 10 U/L — SIGNIFICANT CHANGE UP (ref 4–41)
ANION GAP SERPL CALC-SCNC: 10 MMO/L — SIGNIFICANT CHANGE UP (ref 7–14)
AST SERPL-CCNC: 17 U/L — SIGNIFICANT CHANGE UP (ref 4–40)
BILIRUB SERPL-MCNC: 0.3 MG/DL — SIGNIFICANT CHANGE UP (ref 0.2–1.2)
BUN SERPL-MCNC: 14 MG/DL — SIGNIFICANT CHANGE UP (ref 7–23)
CALCIUM SERPL-MCNC: 8.7 MG/DL — SIGNIFICANT CHANGE UP (ref 8.4–10.5)
CHLORIDE SERPL-SCNC: 93 MMOL/L — LOW (ref 98–107)
CO2 SERPL-SCNC: 30 MMOL/L — SIGNIFICANT CHANGE UP (ref 22–31)
CREAT SERPL-MCNC: 0.51 MG/DL — SIGNIFICANT CHANGE UP (ref 0.5–1.3)
GLUCOSE SERPL-MCNC: 111 MG/DL — HIGH (ref 70–99)
HCT VFR BLD CALC: 39.4 % — SIGNIFICANT CHANGE UP (ref 39–50)
HGB BLD-MCNC: 12.2 G/DL — LOW (ref 13–17)
MCHC RBC-ENTMCNC: 28.4 PG — SIGNIFICANT CHANGE UP (ref 27–34)
MCHC RBC-ENTMCNC: 31 % — LOW (ref 32–36)
MCV RBC AUTO: 91.8 FL — SIGNIFICANT CHANGE UP (ref 80–100)
NRBC # FLD: 0 K/UL — LOW (ref 25–125)
PLATELET # BLD AUTO: 146 K/UL — LOW (ref 150–400)
PMV BLD: 10.7 FL — SIGNIFICANT CHANGE UP (ref 7–13)
POTASSIUM SERPL-MCNC: 3.9 MMOL/L — SIGNIFICANT CHANGE UP (ref 3.5–5.3)
POTASSIUM SERPL-SCNC: 3.9 MMOL/L — SIGNIFICANT CHANGE UP (ref 3.5–5.3)
PROT SERPL-MCNC: 6.4 G/DL — SIGNIFICANT CHANGE UP (ref 6–8.3)
RBC # BLD: 4.29 M/UL — SIGNIFICANT CHANGE UP (ref 4.2–5.8)
RBC # FLD: 14.1 % — SIGNIFICANT CHANGE UP (ref 10.3–14.5)
SODIUM SERPL-SCNC: 133 MMOL/L — LOW (ref 135–145)
WBC # BLD: 3.92 K/UL — SIGNIFICANT CHANGE UP (ref 3.8–10.5)
WBC # FLD AUTO: 3.92 K/UL — SIGNIFICANT CHANGE UP (ref 3.8–10.5)

## 2019-03-08 RX ORDER — ASPIRIN/CALCIUM CARB/MAGNESIUM 324 MG
1 TABLET ORAL
Qty: 0 | Refills: 0 | COMMUNITY

## 2019-03-08 RX ORDER — DILTIAZEM HCL 120 MG
1 CAPSULE, EXT RELEASE 24 HR ORAL
Qty: 0 | Refills: 0 | COMMUNITY

## 2019-03-08 RX ORDER — PREGABALIN 225 MG/1
1 CAPSULE ORAL
Qty: 0 | Refills: 0 | COMMUNITY

## 2019-03-08 RX ORDER — ACETAMINOPHEN 500 MG
1 TABLET ORAL
Qty: 0 | Refills: 0 | COMMUNITY

## 2019-03-08 RX ORDER — L.ACIDOPH/B.ANIMALIS/B.LONGUM 15B CELL
1 CAPSULE ORAL
Qty: 0 | Refills: 0 | COMMUNITY

## 2019-03-08 RX ORDER — LACTOBACILLUS ACIDOPHILUS 100MM CELL
1 CAPSULE ORAL
Qty: 0 | Refills: 0 | COMMUNITY

## 2019-03-08 RX ORDER — OXYCODONE HYDROCHLORIDE 5 MG/1
1 TABLET ORAL
Qty: 0 | Refills: 0 | COMMUNITY

## 2019-03-08 NOTE — H&P PST ADULT - RS GEN PE MLT RESP DETAILS PC
breath sounds equal/respirations non-labored/bilateral pulmonary crackles, pt reports this is chronic, for many years/good air movement/airway patent

## 2019-03-08 NOTE — H&P PST ADULT - HISTORY OF PRESENT ILLNESS
78 yo male with preop dx of malignant neoplasm of prostate presents to pre surgical testing. Pt reports he was diagnosed with prostate cancer in 2008, he has been under surveillance and he has not received any treatment.  Pt reports his PSA is increasing and MRI revealed new prostate lesion.  Pt is scheduled for transperineal placement of spacer oar gel fiducials marker placement on 3/13/19.

## 2019-03-08 NOTE — H&P PST ADULT - NEGATIVE NEUROLOGICAL SYMPTOMS
no headache/no weakness/no generalized seizures/no transient paralysis/no vertigo/no loss of sensation

## 2019-03-08 NOTE — H&P PST ADULT - PROBLEM SELECTOR PLAN 1
Pt is scheduled for transperineal placement of spacer oar gel fiducials marker placement on 3/13/19.   Pre op given and pt able to verbalize understanding.  Pt was seen by PCP 2 days ago, spoke with PCP Dr. Smith via phone, Dr. Smith states he recommends patient he will not be able to sign medical eval unless patient gets cardiac eval.  Pt refusing to see cardiology preop due to time constraint, pt made aware that case can be moved back as necessary.  Case discussed with Dr. Ramachandran, Dr. Ramachandran suggests to speak with surgeon to see if he will proceed with case without cardiac eval.   Dr. Garg notified via email.

## 2019-03-08 NOTE — H&P PST ADULT - NSCAFFEAMTFREQ_GEN_ALL_CORE_SD
+ L sided rib/back pain. No ecchymosis or abrasions noted. Spine appears normal, range of motion is not limited
1-2 cups/cans per day

## 2019-03-08 NOTE — H&P PST ADULT - PMH
B-cell lymphoma  right leg, treated with chemo and RT 2017  Basal cell cancer    Chronic osteomyelitis of left foot    Colon cancer    COPD (chronic obstructive pulmonary disease)    Coronary artery disease  dx 2008  Non Hodgkin's lymphoma    Osteomyelitis of spine    Peripheral vascular disease    Prostate ca B-cell lymphoma  right leg, treated with chemo and RT 2017  Basal cell cancer    Chronic osteomyelitis of left foot    Colon cancer    COPD (chronic obstructive pulmonary disease)  denies inhalers  Coronary artery disease  dx 2008  Interstitial lung disease    Non Hodgkin's lymphoma    Osteomyelitis of spine    Peripheral vascular disease    Prostate ca Atrial fibrillation  paroxymal, "happened once in 2017, it was because of chemotherapy, I was on Eliquis for short period of time"  B-cell lymphoma  right leg, treated with chemo and RT 2017  Basal cell cancer    Chronic osteomyelitis of left foot    Colon cancer    COPD (chronic obstructive pulmonary disease)  denies inhalers  Coronary artery disease  denies stent  Interstitial lung disease    Non Hodgkin's lymphoma    Osteomyelitis of spine    Peripheral vascular disease    Prostate ca

## 2019-03-08 NOTE — H&P PST ADULT - PRIMARY CARE PROVIDER
Dr. Sanjay Smith([phone] Dr. Sanjay Smith([phone]                  Dr. Chon Wong(Card)[phone]

## 2019-03-08 NOTE — H&P PST ADULT - OTHER CARE PROVIDERS
Dr. Grabiel Fofana(Heme/Onc) [phone] Dr. Grabiel Fofana(Heme/Onc) [phone]                                      Dr. Guru Vallecillo(Pulm) [phone]

## 2019-03-08 NOTE — H&P PST ADULT - NEGATIVE CARDIOVASCULAR SYMPTOMS
no palpitations/no orthopnea/no paroxysmal nocturnal dyspnea/no chest pain/no dyspnea on exertion no orthopnea/no paroxysmal nocturnal dyspnea/no chest pain/no palpitations

## 2019-03-09 PROBLEM — C85.10 UNSPECIFIED B-CELL LYMPHOMA, UNSPECIFIED SITE: Chronic | Status: ACTIVE | Noted: 2019-03-08

## 2019-03-09 PROBLEM — I25.10 ATHEROSCLEROTIC HEART DISEASE OF NATIVE CORONARY ARTERY WITHOUT ANGINA PECTORIS: Chronic | Status: ACTIVE | Noted: 2017-02-21

## 2019-03-12 NOTE — ADDENDUM
[FreeTextEntry1] : Addendum to note of March 6, 2019.\par \par            Patient will be undergoing a prostate procedure for history of prostate cancer, which actually was diagnosed in 2016. I had a long and detailed discussion with Dr. Ramachandran regarding the procedure. I reviewed patient's labs, which are acceptable, and he is electrocardiogram, which was done on January 11, 2019 in our office, which shows left axis deviation, with no acute ST-T wave changes.\par \par In my opinion, the patient will be intermediate risk, but the procedure, itself, is extremely low risk. There is no medical contraindication for the proposed procedure, which is scheduled for March 13, 2019. Patient is at acceptable risk. There is no acute cardiac problem. Patient medically stable for proposed procedure.

## 2019-03-12 NOTE — ASSESSMENT
[FreeTextEntry1] : Assessment and plan:\par \par 1. Back pain, multifactorial, secondary to degenerative joint disease and deconditioned. I recommend low dose, meloxicam 7.5 mg.\par \par 2. Prostate cancer. Detailed discussion with the different modalities. Patient had multiple questions. He was satisfied with the answers. We spent approximately 40 minutes on this one topic.\par \par 3. Chronic obstructive pulmonary disease, interstitial lung disease, pulmonary nodules. Patient has a right upper lobe nodule, which appears to be stable. I reviewed with patient. PET scan did go back to 2015\par \par 4. B-cell lymphoma followup with hematology.\par \par 5. Face-to-face with patient one hour

## 2019-03-12 NOTE — REVIEW OF SYSTEMS
[Fatigue] : fatigue [Cough] : cough [Dyspnea on Exertion] : dyspnea on exertion [Joint Pain] : joint pain [Joint Stiffness] : joint stiffness [Muscle Pain] : muscle pain [Muscle Weakness] : muscle weakness [Back Pain] : back pain [Negative] : Heme/Lymph [Wheezing] : no wheezing [Joint Swelling] : no joint swelling

## 2019-03-12 NOTE — HISTORY OF PRESENT ILLNESS
[FreeTextEntry1] : back pain, fatigue, SOB, [de-identified] : Patient is a 79-year-old gentleman who presents today for a followup appointment. Patient has multiple medical problems and multiple complaints. Patient is complaining of shortness of breath with exertion which has progressively been worsening. He does have a long history of interstitial lung disease. Lung nodules, and chronic obstructive pulmonary disease. He sees a pulmonologist on a regular basis and recently was given an inhaler. Patient complaining of back pain. He has degenerative joint disease of the spine. He ambulates with walker. He is totally deconditioned. Recently diagnosed with prostate cancer and would like to discuss the different modalities of treatment and patient has diffuse large B-cell lymphoma followed very closely by hematology oncology.\par \par The patient is awake, alert and oriented x3, presently in no acute distress. He denies any chest pain, but does admit to shortness of breath, diffuse pain, especially of the spine. He denies nausea or vomiting, but does admit to diarrhea alternating with constipation, which appears to be well controlled. Most of the time with Metamucil.

## 2019-03-12 NOTE — PHYSICAL EXAM
[No Acute Distress] : no acute distress [Normal Sclera/Conjunctiva] : normal sclera/conjunctiva [PERRL] : pupils equal round and reactive to light [EOMI] : extraocular movements intact [Normal Oropharynx] : the oropharynx was normal [Normal TMs] : both tympanic membranes were normal [No JVD] : no jugular venous distention [Supple] : supple [Normal Rate] : normal rate  [Regular Rhythm] : with a regular rhythm [No Carotid Bruits] : no carotid bruits [Soft] : abdomen soft [Non Tender] : non-tender [Normal Supraclavicular Nodes] : no supraclavicular lymphadenopathy [Speech Grossly Normal] : speech grossly normal [Memory Grossly Normal] : memory grossly normal [Normal Affect] : the affect was normal [Alert and Oriented x3] : oriented to person, place, and time [de-identified] : bernie [de-identified] : pain and decondioned [de-identified] : unsteady, ambulates with walker

## 2019-03-13 PROCEDURE — 76872 US TRANSRECTAL: CPT | Mod: 26

## 2019-03-13 PROCEDURE — 55876 PLACE RT DEVICE/MARKER PROS: CPT

## 2019-03-13 PROCEDURE — 76942 ECHO GUIDE FOR BIOPSY: CPT | Mod: 26,59

## 2019-03-13 NOTE — PROCEDURE
[FreeTextEntry1] : FIDUCIALS MARKER PLACEMENT [FreeTextEntry2] : FIDUCIALS MARKER PLACEMENT IN PREPARATION FOR RADIATION TREATMENT [FreeTextEntry3] : Mr. Thomas Richard is a 79 year-old-male with prostate cancer. He was initially diagnosed in 2008, with Israel 6 adenocarcinoma of the prostate. Patient present here today for marker placement in preparation for radiation therapy for his prostate cancer treatment.\par \par He was started on prophylaxis antibiotic a day prior to procedure to the continued until the day after the procedure.\par \par Time out observed with patient name, date of birth, procedure, position, and site verified. \par \par IM Amikacin given on the right gluteus area one hour before the procedure.\par \par Patient was placed into the left lateral decubitus position and while maintaining aseptic technique a U/S probe was inserted into the rectum to visualize the prostate.  7 cc 1 % lidocaine was infused into the left and right base of the prostate and the apex followed by injection of a Fiducial into the left, right base and apex of the prostate.  Blood loss was minimal and there were no unforeseen complications during and after the procedure. The patient was informed that he may experience blood in urine, stool, and semen for several days.  He was instructed to call us immediately if he has any concerns with regard to excessive bleeding, hematuria, fever, or chills.  He was also instructed to continue his antibiotic course until completed. CT/SIM appointment was given for 3/20/19. Prep instruction given and understanding verbalized.\par

## 2019-03-19 ENCOUNTER — APPOINTMENT (OUTPATIENT)
Dept: MRI IMAGING | Facility: IMAGING CENTER | Age: 79
End: 2019-03-19

## 2019-03-28 LAB
ALBUMIN SERPL ELPH-MCNC: 3.5 G/DL
ALP BLD-CCNC: 93 U/L
ALT SERPL-CCNC: 11 U/L
ANION GAP SERPL CALC-SCNC: 8 MMOL/L
AST SERPL-CCNC: 17 U/L
B2 MICROGLOB SERPL-MCNC: 2.1 MG/L
BILIRUB SERPL-MCNC: 0.4 MG/DL
BUN SERPL-MCNC: 10 MG/DL
CALCIUM SERPL-MCNC: 9.3 MG/DL
CHLORIDE SERPL-SCNC: 93 MMOL/L
CO2 SERPL-SCNC: 34 MMOL/L
CREAT SERPL-MCNC: 0.45 MG/DL
GLUCOSE SERPL-MCNC: 111 MG/DL
LDH SERPL-CCNC: 152 U/L
POTASSIUM SERPL-SCNC: 5.9 MMOL/L
PROT SERPL-MCNC: 6.1 G/DL
SODIUM SERPL-SCNC: 135 MMOL/L

## 2019-03-28 PROCEDURE — 77338 DESIGN MLC DEVICE FOR IMRT: CPT | Mod: 26

## 2019-03-28 PROCEDURE — 77301 RADIOTHERAPY DOSE PLAN IMRT: CPT | Mod: 26

## 2019-03-28 PROCEDURE — 77300 RADIATION THERAPY DOSE PLAN: CPT | Mod: 26

## 2019-04-04 PROCEDURE — 77387B: CUSTOM | Mod: 26

## 2019-04-04 PROCEDURE — 77427 RADIATION TX MANAGEMENT X5: CPT

## 2019-04-05 PROCEDURE — 77387B: CUSTOM | Mod: 26

## 2019-04-08 PROCEDURE — 77387B: CUSTOM | Mod: 26

## 2019-04-08 NOTE — HISTORY OF PRESENT ILLNESS
[FreeTextEntry1] : Mr. Thomas Richard is a 78 year-old-male with prostate cancer. He was initially diagnosed in 2008, with Israel 6 and was on active surveillance. \par \par Patient present here today for radiation treatment. He report urinary frequency. Denies any other urinary bothers and bowel issues. He completed 750/7000 cGy today.

## 2019-04-08 NOTE — REVIEW OF SYSTEMS
[Hematuria: Grade 0] : Hematuria: Grade 0 [Urinary Incontinence: Grade 0] : Urinary Incontinence: Grade 0  [Urinary Retention: Grade 0] : Urinary Retention: Grade 0 [Urinary Tract Pain: Grade 0] : Urinary Tract Pain: Grade 0 [Urinary Urgency: Grade 0] : Urinary Urgency: Grade 0 [Urinary Frequency: Grade 1 - Present] : Urinary Frequency: Grade 1 - Present [Skin Hyperpigmentation: Grade 0] : Skin Hyperpigmentation: Grade 0 [Dermatitis Radiation: Grade 0] : Dermatitis Radiation: Grade 0

## 2019-04-09 ENCOUNTER — OUTPATIENT (OUTPATIENT)
Dept: OUTPATIENT SERVICES | Facility: HOSPITAL | Age: 79
LOS: 1 days | Discharge: HOME | End: 2019-04-09

## 2019-04-09 DIAGNOSIS — Z98.1 ARTHRODESIS STATUS: Chronic | ICD-10-CM

## 2019-04-09 DIAGNOSIS — Z98.89 OTHER SPECIFIED POSTPROCEDURAL STATES: Chronic | ICD-10-CM

## 2019-04-09 DIAGNOSIS — C85.88 OTHER SPECIFIED TYPES OF NON-HODGKIN LYMPHOMA, LYMPH NODES OF MULTIPLE SITES: ICD-10-CM

## 2019-04-09 PROBLEM — I48.91 UNSPECIFIED ATRIAL FIBRILLATION: Chronic | Status: ACTIVE | Noted: 2019-03-08

## 2019-04-09 PROBLEM — J84.9 INTERSTITIAL PULMONARY DISEASE, UNSPECIFIED: Chronic | Status: ACTIVE | Noted: 2019-03-08

## 2019-04-09 PROCEDURE — 77387B: CUSTOM | Mod: 26

## 2019-04-10 PROCEDURE — 77387B: CUSTOM | Mod: 26

## 2019-04-11 PROCEDURE — 77427 RADIATION TX MANAGEMENT X5: CPT

## 2019-04-11 PROCEDURE — 77014: CPT | Mod: 26

## 2019-04-12 PROCEDURE — 77387B: CUSTOM | Mod: 26

## 2019-04-15 PROCEDURE — 77387B: CUSTOM | Mod: 26

## 2019-04-15 NOTE — HISTORY OF PRESENT ILLNESS
[FreeTextEntry1] : Mr. Thomas Richard is a 78 year-old-male with prostate cancer. He was initially diagnosed in 2008, with Israel 6 and was on active surveillance. \par \par Patient present here today for radiation treatment. He report urinary frequency. Denies any other urinary bothers and bowel issues. He complain of SOB with exertion. He said his pulmonologist had ordered Cat scan of his lung. He has to schedule appointment for CT. His 02 sat is 98%.  He completed 2000/7000 cGy today.

## 2019-04-16 ENCOUNTER — APPOINTMENT (OUTPATIENT)
Dept: HEMATOLOGY ONCOLOGY | Facility: CLINIC | Age: 79
End: 2019-04-16
Payer: MEDICARE

## 2019-04-16 ENCOUNTER — RESULT REVIEW (OUTPATIENT)
Age: 79
End: 2019-04-16

## 2019-04-16 VITALS
TEMPERATURE: 97.6 F | BODY MASS INDEX: 21.81 KG/M2 | OXYGEN SATURATION: 93 % | HEART RATE: 73 BPM | SYSTOLIC BLOOD PRESSURE: 155 MMHG | RESPIRATION RATE: 16 BRPM | WEIGHT: 165.34 LBS | DIASTOLIC BLOOD PRESSURE: 78 MMHG

## 2019-04-16 DIAGNOSIS — L60.0 INGROWING NAIL: ICD-10-CM

## 2019-04-16 DIAGNOSIS — J44.9 CHRONIC OBSTRUCTIVE PULMONARY DISEASE, UNSPECIFIED: ICD-10-CM

## 2019-04-16 DIAGNOSIS — Z92.21 PERSONAL HISTORY OF ANTINEOPLASTIC CHEMOTHERAPY: ICD-10-CM

## 2019-04-16 DIAGNOSIS — I48.0 PAROXYSMAL ATRIAL FIBRILLATION: ICD-10-CM

## 2019-04-16 DIAGNOSIS — Z87.891 PERSONAL HISTORY OF NICOTINE DEPENDENCE: ICD-10-CM

## 2019-04-16 DIAGNOSIS — R60.0 LOCALIZED EDEMA: ICD-10-CM

## 2019-04-16 DIAGNOSIS — R97.20 ELEVATED PROSTATE, SPECIFIC ANTIGEN [PSA]: ICD-10-CM

## 2019-04-16 DIAGNOSIS — Z78.9 OTHER SPECIFIED HEALTH STATUS: ICD-10-CM

## 2019-04-16 LAB
BASOPHILS # BLD AUTO: 0 K/UL — SIGNIFICANT CHANGE UP (ref 0–0.2)
BASOPHILS NFR BLD AUTO: 0.5 % — SIGNIFICANT CHANGE UP (ref 0–2)
EOSINOPHIL # BLD AUTO: 0.1 K/UL — SIGNIFICANT CHANGE UP (ref 0–0.5)
EOSINOPHIL NFR BLD AUTO: 1.4 % — SIGNIFICANT CHANGE UP (ref 0–6)
HCT VFR BLD CALC: 40.1 % — SIGNIFICANT CHANGE UP (ref 39–50)
HGB BLD-MCNC: 12.9 G/DL — LOW (ref 13–17)
LYMPHOCYTES # BLD AUTO: 0.8 K/UL — LOW (ref 1–3.3)
LYMPHOCYTES # BLD AUTO: 11.7 % — LOW (ref 13–44)
MCHC RBC-ENTMCNC: 28.7 PG — SIGNIFICANT CHANGE UP (ref 27–34)
MCHC RBC-ENTMCNC: 32.1 G/DL — SIGNIFICANT CHANGE UP (ref 32–36)
MCV RBC AUTO: 89.5 FL — SIGNIFICANT CHANGE UP (ref 80–100)
MONOCYTES # BLD AUTO: 1.1 K/UL — HIGH (ref 0–0.9)
MONOCYTES NFR BLD AUTO: 15.1 % — HIGH (ref 2–14)
NEUTROPHILS # BLD AUTO: 5 K/UL — SIGNIFICANT CHANGE UP (ref 1.8–7.4)
NEUTROPHILS NFR BLD AUTO: 71.3 % — SIGNIFICANT CHANGE UP (ref 43–77)
PLATELET # BLD AUTO: 211 K/UL — SIGNIFICANT CHANGE UP (ref 150–400)
RBC # BLD: 4.47 M/UL — SIGNIFICANT CHANGE UP (ref 4.2–5.8)
RBC # FLD: 13.2 % — SIGNIFICANT CHANGE UP (ref 10.3–14.5)
WBC # BLD: 7 K/UL — SIGNIFICANT CHANGE UP (ref 3.8–10.5)
WBC # FLD AUTO: 7 K/UL — SIGNIFICANT CHANGE UP (ref 3.8–10.5)

## 2019-04-16 PROCEDURE — 99214 OFFICE O/P EST MOD 30 MIN: CPT

## 2019-04-16 PROCEDURE — 77387B: CUSTOM | Mod: 26

## 2019-04-16 RX ORDER — AMOXICILLIN AND CLAVULANATE POTASSIUM 875; 125 MG/1; MG/1
875-125 TABLET, COATED ORAL
Qty: 6 | Refills: 0 | Status: DISCONTINUED | COMMUNITY
Start: 2019-03-06 | End: 2019-04-16

## 2019-04-16 NOTE — HISTORY OF PRESENT ILLNESS
[Disease:__________________________] : Disease: [unfilled] [Treatment Protocol] : Treatment Protocol [de-identified] : MATHEW [de-identified] : Mr. 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  Nodules were painful.  The right thigh became markedly swollen ; has had a (-) Doppler study.  He also has had MRI of femur.  He was staged with PET/CT and began therapy with R-COEP Jan., 2017..\par \par History of osteomyelitis -had portions of C spine, lower spine, heel, with MRS, ? around 2010.  On long suppressive minocycline, for heel ulcer/osteo,  followed by Dr. Pena, [place].\par Sees pulmonologist: chronic but stable pulmonary changes ? smoking related; sees Dr. Guru Vallecillo.  [de-identified] : DLBCL, ABC type [FreeTextEntry1] : s/p R-COEP  and  Neulasta x 6 completed  4/19/17        s/p RT 3060 cGy completed 7/25/17 [de-identified] : DLBCL- completed R-COEP x 6 cycles 4/2017.\par He c/o increased dyspnea and fatigue, started with the RT which he has otherwise been tolerating well. \par He had been previously been followed for prostate CA, off therapy and recently began tx with RT under the care of Dr. Garg. \par His pulmonologist ordered a CT chest which has to be scheduled. \par As of today, he has completed 2000 of planned 7000 cGY to the prostate. \par He has had no cough but has had persistent nasal drip. No chest pain. \par He has not gained or lost weight as compared to May 2018. Weight loss began to occur in Jan 2017.\par

## 2019-04-16 NOTE — CONSULT LETTER
[Dear  ___] : Dear  [unfilled], [Please see my note below.] : Please see my note below. [Courtesy Letter:] : I had the pleasure of seeing your patient, [unfilled], in my office today. [Consult Closing:] : Thank you very much for allowing me to participate in the care of this patient.  If you have any questions, please do not hesitate to contact me. [Sincerely,] : Sincerely, [DrHenry  ___] : Dr. NOWAK [FreeTextEntry2] : Hever Vieyra M.D. [FreeTextEntry3] : Grabiel Swenson M.D., [facility]

## 2019-04-16 NOTE — ADDENDUM
[FreeTextEntry1] : Documented by Barbara Rocha acting as a scribe for Dr. Grabiel Swenson on 4/16/19. \par \par All medical record entries made by the Scribe were at my, Dr. Grabiel Swenson's, direction and personally dictated by me on 4/16/19. I have reviewed the chart and agree that the record accurately reflects my personal performance of the history, physical exam, procedure and imaging.\par

## 2019-04-16 NOTE — ASSESSMENT
[Palliative] : Goals of care discussed with patient: Palliative [Palliative Care Plan] : not applicable at this time [FreeTextEntry1] : DLBCL - completed R-COEP x 6 cycles 4/2017 after interim PET /CT showed Deauville 4 response. He received 3060 cGy, completed 7/2017. Most recent PET/CT in Nov 2018 a new FDG+ focus on the left prostate which led to the start of new therapy with Dr. Garg. Continued improvement was seen in the distal right thigh. \par He has no constitutional symptoms. \par The major issue is his dyspnea. He will be following up with his pulmonologist. I will call him today. \par PAF- resolved, Cardiology not insistent on continuing eliquis and cartia. Remans without recurrence of AF. \par FUENTES, abnormal lung exam, to see his pulmonologist and is going to have a chest CT.  \par Chronic osteo- off minocycline, no evidence of recurrence \par weight loss - not explained, has stabilized in last few months.  No further weight loss in last 6 months. \par Check CBC, CMP, LDH, B2M\par RV 3 mos

## 2019-04-16 NOTE — PHYSICAL EXAM
[Restricted in physically strenuous activity but ambulatory and able to carry out work of a light or sedentary nature] : Status 1- Restricted in physically strenuous activity but ambulatory and able to carry out work of a light or sedentary nature, e.g., light house work, office work [Thin] : thin [Normal] : grossly intact [de-identified] : bilateral exp snd insp rhonchi rales at left base [de-identified] : no CVAT [de-identified] : LE venous stasis changes L > R [de-identified] : right leg no evidence of active disease, flat pigmented macules remain  [de-identified] : the area of treated disease of right thigh is without any evidence of recurrence, the lesions are flat and there is no local tenderness

## 2019-04-17 PROCEDURE — 77387B: CUSTOM | Mod: 26

## 2019-04-18 PROCEDURE — 77387B: CUSTOM | Mod: 26

## 2019-04-18 PROCEDURE — 77427 RADIATION TX MANAGEMENT X5: CPT

## 2019-04-19 PROCEDURE — 77387B: CUSTOM | Mod: 26

## 2019-04-21 ENCOUNTER — FORM ENCOUNTER (OUTPATIENT)
Age: 79
End: 2019-04-21

## 2019-04-22 ENCOUNTER — OUTPATIENT (OUTPATIENT)
Dept: OUTPATIENT SERVICES | Facility: HOSPITAL | Age: 79
LOS: 1 days | End: 2019-04-22
Payer: MEDICARE

## 2019-04-22 ENCOUNTER — APPOINTMENT (OUTPATIENT)
Dept: CT IMAGING | Facility: IMAGING CENTER | Age: 79
End: 2019-04-22
Payer: MEDICARE

## 2019-04-22 DIAGNOSIS — J44.9 CHRONIC OBSTRUCTIVE PULMONARY DISEASE, UNSPECIFIED: ICD-10-CM

## 2019-04-22 DIAGNOSIS — Z98.89 OTHER SPECIFIED POSTPROCEDURAL STATES: Chronic | ICD-10-CM

## 2019-04-22 DIAGNOSIS — Z98.1 ARTHRODESIS STATUS: Chronic | ICD-10-CM

## 2019-04-22 PROCEDURE — 71250 CT THORAX DX C-: CPT | Mod: 26

## 2019-04-22 PROCEDURE — 77387B: CUSTOM | Mod: 26

## 2019-04-22 PROCEDURE — 71250 CT THORAX DX C-: CPT

## 2019-04-22 NOTE — HISTORY OF PRESENT ILLNESS
[FreeTextEntry1] : Mr. Thomas Richard is a 78 year-old-male with prostate cancer. He was initially diagnosed in 2008, with Israel 6 and was on active surveillance. \par \par Patient present here today for radiation treatment. He report urinary frequency. Denies any other urinary bothers and bowel issues. He complaint of SOB with exertion. He had not gone for the CT chest that was ordered by his pulmonologist. His 02 sat is 98%.  He completed 3250/7000 cGy today.

## 2019-04-22 NOTE — REVIEW OF SYSTEMS
[Hematuria: Grade 0] : Hematuria: Grade 0 [Urinary Incontinence: Grade 0] : Urinary Incontinence: Grade 0  [Urinary Tract Pain: Grade 0] : Urinary Tract Pain: Grade 0 [Urinary Retention: Grade 0] : Urinary Retention: Grade 0 [Urinary Urgency: Grade 0] : Urinary Urgency: Grade 0 [Urinary Frequency: Grade 1 - Present] : Urinary Frequency: Grade 1 - Present [Dermatitis Radiation: Grade 0] : Dermatitis Radiation: Grade 0 [Skin Hyperpigmentation: Grade 0] : Skin Hyperpigmentation: Grade 0 [Dyspnea: Grade 1 - Shortness of breath with moderate exertion] : Dyspnea: Grade 1 - Shortness of breath with moderate exertion

## 2019-04-22 NOTE — VITALS
[Least Pain Intensity: 0/10] : 0/10 [Maximal Pain Intensity: 0/10] : 0/10 [ECOG Performance Status: 1 - Restricted in physically strenuous activity but ambulatory and able to carry out work of a light or sedentary nature] : Performance Status: 1 - Restricted in physically strenuous activity but ambulatory and able to carry out work of a light or sedentary nature, e.g., light house work, office work [80: Normal activity with effort; some signs or symptoms of disease.] : 80: Normal activity with effort; some signs or symptoms of disease.

## 2019-04-23 RX ORDER — AZITHROMYCIN 250 MG/1
250 TABLET, FILM COATED ORAL
Qty: 1 | Refills: 0 | Status: ACTIVE | COMMUNITY
Start: 2019-04-23 | End: 1900-01-01

## 2019-05-02 ENCOUNTER — APPOINTMENT (OUTPATIENT)
Dept: FAMILY MEDICINE | Facility: CLINIC | Age: 79
End: 2019-05-02
Payer: MEDICARE

## 2019-05-02 VITALS
SYSTOLIC BLOOD PRESSURE: 114 MMHG | OXYGEN SATURATION: 91 % | DIASTOLIC BLOOD PRESSURE: 58 MMHG | TEMPERATURE: 98.2 F | HEART RATE: 101 BPM

## 2019-05-02 VITALS — BODY MASS INDEX: 21.64 KG/M2 | WEIGHT: 164 LBS

## 2019-05-02 VITALS — RESPIRATION RATE: 16 BRPM

## 2019-05-02 DIAGNOSIS — J18.9 PNEUMONIA, UNSPECIFIED ORGANISM: ICD-10-CM

## 2019-05-02 DIAGNOSIS — R53.82 CHRONIC FATIGUE, UNSPECIFIED: ICD-10-CM

## 2019-05-02 DIAGNOSIS — R06.02 SHORTNESS OF BREATH: ICD-10-CM

## 2019-05-02 DIAGNOSIS — Z92.3 PERSONAL HISTORY OF IRRADIATION: ICD-10-CM

## 2019-05-02 DIAGNOSIS — J84.9 INTERSTITIAL PULMONARY DISEASE, UNSPECIFIED: ICD-10-CM

## 2019-05-02 PROCEDURE — 99215 OFFICE O/P EST HI 40 MIN: CPT

## 2019-05-02 RX ORDER — PREDNISONE 10 MG/1
10 TABLET ORAL
Qty: 21 | Refills: 1 | Status: ACTIVE | COMMUNITY
Start: 2019-05-02 | End: 1900-01-01

## 2019-05-02 RX ORDER — IPRATROPIUM BROMIDE AND ALBUTEROL SULFATE 2.5; .5 MG/3ML; MG/3ML
0.5-2.5 (3) SOLUTION RESPIRATORY (INHALATION) 4 TIMES DAILY
Qty: 120 | Refills: 4 | Status: ACTIVE | COMMUNITY
Start: 2019-05-02 | End: 1900-01-01

## 2019-05-02 RX ORDER — NEBULIZER ACCESSORIES
KIT MISCELLANEOUS
Qty: 1 | Refills: 0 | Status: ACTIVE | COMMUNITY
Start: 2019-05-02 | End: 1900-01-01

## 2019-05-02 NOTE — COUNSELING
[Fall prevention counseling provided] : fall prevention  [Behavioral health counseling provided] : behavioral health  [Plan in advance] : Plan in advance [Sleep ___ hours/day] : Sleep [unfilled] hours/day [Engage in a relaxing activity] : Engage in a relaxing activity [Use proper foot wear] : Use proper foot wear [No throw rugs] : No throw rugs [Adequate lighting] : Adequate lighting [Patient Non-adherent to care plan] : Patient non-adherent to care plan [Use recommended devices] : Use recommended devices [Good understanding] : Patient has a good understanding of lifestyle changes and the steps needed to achieve self management goals

## 2019-05-02 NOTE — PHYSICAL EXAM
[PERRL] : pupils equal round and reactive to light [Normal Sclera/Conjunctiva] : normal sclera/conjunctiva [No Acute Distress] : no acute distress [Normal TMs] : both tympanic membranes were normal [Normal Oropharynx] : the oropharynx was normal [EOMI] : extraocular movements intact [Supple] : supple [Normal Rate] : normal rate  [No JVD] : no jugular venous distention [No Carotid Bruits] : no carotid bruits [Regular Rhythm] : with a regular rhythm [Normal Supraclavicular Nodes] : no supraclavicular lymphadenopathy [Non Tender] : non-tender [Soft] : abdomen soft [Speech Grossly Normal] : speech grossly normal [Memory Grossly Normal] : memory grossly normal [Normal Affect] : the affect was normal [Alert and Oriented x3] : oriented to person, place, and time [de-identified] : rhonchi,Patient is moving very little air [de-identified] : unsteady, ambulates with walker

## 2019-05-02 NOTE — HISTORY OF PRESENT ILLNESS
[de-identified] : Patient is an 79-year-old gentleman, who presents today for followup appointment. Patient presently undergoing radiation treatment for prostate cancer, which is being administered by Dr. Garg unfortunately, the patient is feeling extremely fatigued. Subsequently, recently diagnosed with pneumonia and presently radiation treatment is on hold. Patient is awake, alert, and oriented x3. Presently denies any chest pain. He does admit to shortness of breath and extreme fatigue. [FreeTextEntry1] : Fatigue

## 2019-05-02 NOTE — ASSESSMENT
[FreeTextEntry1] : Assessment and plan:\par \par Acute exacerbation of COPD with underlying pneumonia. Recommended patient continue antibiotics and nebulizer treatments, which are new for the patient. Prescription was sent to pharmacy, my recommendations would be hospitalization. Patient presently declines he is aware of the risks, not been hospitalized, but he said he would want to attempt antibiotics and nebulizers at this point and if necessary. He will be transported to hospital by his brother. Patient refuses hospitalization,I explained to patient risks even death.Face to face with patient 40 minutes.

## 2019-05-02 NOTE — REVIEW OF SYSTEMS
[Fatigue] : fatigue [Cough] : cough [Joint Pain] : joint pain [Dyspnea on Exertion] : dyspnea on exertion [Joint Stiffness] : joint stiffness [Muscle Pain] : muscle pain [Muscle Weakness] : muscle weakness [Back Pain] : back pain [Negative] : Heme/Lymph [Fever] : no fever [Night Sweats] : no night sweats [Chills] : no chills [Recent Change In Weight] : ~T no recent weight change [Joint Swelling] : no joint swelling [Wheezing] : no wheezing

## 2019-05-16 PROCEDURE — 77387B: CUSTOM | Mod: 26

## 2019-05-17 ENCOUNTER — APPOINTMENT (OUTPATIENT)
Dept: FAMILY MEDICINE | Facility: CLINIC | Age: 79
End: 2019-05-17
Payer: MEDICARE

## 2019-05-17 VITALS
WEIGHT: 165 LBS | RESPIRATION RATE: 16 BRPM | DIASTOLIC BLOOD PRESSURE: 50 MMHG | SYSTOLIC BLOOD PRESSURE: 110 MMHG | BODY MASS INDEX: 21.87 KG/M2 | HEIGHT: 73 IN | HEART RATE: 88 BPM | TEMPERATURE: 97.4 F | OXYGEN SATURATION: 91 %

## 2019-05-17 DIAGNOSIS — R06.09 OTHER FORMS OF DYSPNEA: ICD-10-CM

## 2019-05-17 DIAGNOSIS — C61 MALIGNANT NEOPLASM OF PROSTATE: ICD-10-CM

## 2019-05-17 DIAGNOSIS — C83.30 DIFFUSE LARGE B-CELL LYMPHOMA, UNSPECIFIED SITE: ICD-10-CM

## 2019-05-17 DIAGNOSIS — J44.9 CHRONIC OBSTRUCTIVE PULMONARY DISEASE, UNSPECIFIED: ICD-10-CM

## 2019-05-17 PROCEDURE — 77387B: CUSTOM | Mod: 26

## 2019-05-17 PROCEDURE — 99214 OFFICE O/P EST MOD 30 MIN: CPT

## 2019-05-17 NOTE — HISTORY OF PRESENT ILLNESS
[FreeTextEntry1] : COPD [de-identified] : She is an 79-year-old gentleman, who presents today for followup appointment. Status post episode of acute exacerbation of COPD patient did start nebulizer treatments. States that he is feeling much better. Recently seen by JUANI SHIN patient has restarted radiation treatment.

## 2019-05-17 NOTE — ASSESSMENT
[FreeTextEntry1] : Assessment and plan:\par \par Patient will continue nebulizer treatment, and oxygen he will followup with pulmonology. Hematology, oncology, and RT.\par \par I was face-to-face with the patient 25 minutes for coordination of care and counseling regarding chronic obstructive pulmonary disease.

## 2019-05-17 NOTE — PHYSICAL EXAM
[No Acute Distress] : no acute distress [Normal Sclera/Conjunctiva] : normal sclera/conjunctiva [Normal Voice/Communication] : normal voice/communication [PERRL] : pupils equal round and reactive to light [EOMI] : extraocular movements intact [Normal TMs] : both tympanic membranes were normal [Normal Oropharynx] : the oropharynx was normal [No JVD] : no jugular venous distention [Supple] : supple [Regular Rhythm] : with a regular rhythm [Normal Rate] : normal rate  [No Carotid Bruits] : no carotid bruits [No Edema] : there was no peripheral edema [Soft] : abdomen soft [Normal Supraclavicular Nodes] : no supraclavicular lymphadenopathy [Speech Grossly Normal] : speech grossly normal [Non Tender] : non-tender [Memory Grossly Normal] : memory grossly normal [Normal Affect] : the affect was normal [Normal Mood] : the mood was normal [Alert and Oriented x3] : oriented to person, place, and time [de-identified] : course breath sounds ,air movement improved [de-identified] : unsteady, ambulates with walker

## 2019-05-17 NOTE — REVIEW OF SYSTEMS
[Fever] : no fever [Night Sweats] : no night sweats [Fatigue] : fatigue [Chills] : no chills [Recent Change In Weight] : ~T no recent weight change [Wheezing] : no wheezing [Cough] : cough [Joint Pain] : joint pain [Joint Stiffness] : joint stiffness [Dyspnea on Exertion] : dyspnea on exertion [Back Pain] : back pain [Joint Swelling] : no joint swelling [Negative] : Neurological

## 2019-05-20 PROCEDURE — 77387B: CUSTOM | Mod: 26

## 2019-05-20 PROCEDURE — 77427 RADIATION TX MANAGEMENT X5: CPT

## 2019-05-20 NOTE — REVIEW OF SYSTEMS
[Hematuria: Grade 0] : Hematuria: Grade 0 [Urinary Incontinence: Grade 0] : Urinary Incontinence: Grade 0  [Urinary Retention: Grade 0] : Urinary Retention: Grade 0 [Urinary Tract Pain: Grade 0] : Urinary Tract Pain: Grade 0 [Urinary Urgency: Grade 0] : Urinary Urgency: Grade 0 [Urinary Frequency: Grade 1 - Present] : Urinary Frequency: Grade 1 - Present [Dyspnea: Grade 1 - Shortness of breath with moderate exertion] : Dyspnea: Grade 1 - Shortness of breath with moderate exertion [Skin Hyperpigmentation: Grade 0] : Skin Hyperpigmentation: Grade 0 [Dermatitis Radiation: Grade 0] : Dermatitis Radiation: Grade 0

## 2019-05-20 NOTE — HISTORY OF PRESENT ILLNESS
[FreeTextEntry1] : Mr. Thomas Richard is a 78 year-old-male with prostate cancer. He was initially diagnosed in 2008, with [place] 6 and was on active surveillance. \par \par Patient present here today for radiation treatment. He report urinary hesitancy and urgency.  He completed 4000/7000 cGy today.

## 2019-05-21 ENCOUNTER — OTHER (OUTPATIENT)
Age: 79
End: 2019-05-21

## 2019-05-21 PROCEDURE — 77387B: CUSTOM | Mod: 26

## 2019-05-22 PROCEDURE — 77387B: CUSTOM | Mod: 26

## 2019-05-23 PROCEDURE — 77387B: CUSTOM | Mod: 26

## 2019-05-24 PROCEDURE — 77387B: CUSTOM | Mod: 26

## 2019-06-24 ENCOUNTER — APPOINTMENT (OUTPATIENT)
Dept: FAMILY MEDICINE | Facility: CLINIC | Age: 79
End: 2019-06-24

## 2019-08-10 LAB
ALBUMIN SERPL ELPH-MCNC: 3.7 G/DL
ALP BLD-CCNC: 96 U/L
ALT SERPL-CCNC: 14 U/L
ANION GAP SERPL CALC-SCNC: 11 MMOL/L
AST SERPL-CCNC: 19 U/L
B2 MICROGLOB SERPL-MCNC: 1.9 MG/L
BILIRUB SERPL-MCNC: 0.4 MG/DL
BUN SERPL-MCNC: 10 MG/DL
CALCIUM SERPL-MCNC: 9.1 MG/DL
CHLORIDE SERPL-SCNC: 86 MMOL/L
CO2 SERPL-SCNC: 32 MMOL/L
CREAT SERPL-MCNC: 0.37 MG/DL
GLUCOSE SERPL-MCNC: 117 MG/DL
LDH SERPL-CCNC: 156 U/L
POTASSIUM SERPL-SCNC: 4.6 MMOL/L
PROT SERPL-MCNC: 6.3 G/DL
SODIUM SERPL-SCNC: 129 MMOL/L

## 2020-07-13 NOTE — PATIENT PROFILE ADULT. - AS SC BRADEN NUTRITION
Sent. She has severe wounds. We need to be VERY careflul as she has history of suboxone use and opiate dependence as well. However she has signfiicant wounds being dealt with acutely at this time   (3) adequate

## 2020-12-21 PROBLEM — Z87.09 HISTORY OF ACUTE BRONCHITIS: Status: RESOLVED | Noted: 2019-01-11 | Resolved: 2020-12-21

## 2021-01-01 NOTE — ED ADULT NURSE NOTE - CCCP TRG CHIEF CMPLNT
NNP Note:    Infant continues to have brief, but frequent desat spells. Will start NC @ 1 lpm, titrate O2 to maintain sats >94%. Family aware.     Barney Saint, CNP afib

## 2021-07-05 NOTE — H&P PST ADULT - WEIGHT IN LBS
Patient ID: Kelly Peña is a 37 year old female.    Chief Complaint   Patient presents with   • Video Visit   • Psychiatric Problem     depression       HPI:  Virtual video visit due to covid-19 pandemic with patient consent   MDD/LEXIS  Feels both worsened  Increased stress with watching kids at home during summer break  Kids not listening or respecting her when she speaks and she feels guilt when she does have to discipline kids verbally  She is exercising, has been losing weight  Coming over to parents' home helps her feel relaxed  Marriage is going well, but  does not have great patience with kids' behavior either  Taking venlafaxine medication but feels she is drifting back into feeling more depressed and anxious recently  Wants to increase med dose  Seeing therapist  denies cp, sob, lightheadedness, syncope, fatigue, n/v, diarrhea, constipation, HA, vision changes, fevers, cough          Patient Active Problem List   Diagnosis   • Celiac disease   • Herpes simplex   • Hypothyroid   • Mixed stress and urge urinary incontinence   • Episode of recurrent major depressive disorder (CMS/HCC)   • Generalized anxiety disorder   • Fatigue   • Lesion of right lower eyelid   • Intermittent right upper quadrant abdominal pain   • Allergic rhinitis   • Female stress incontinence     Past Medical History:   Diagnosis Date   • Allergies     unknown cause, controlled w/po rx   • Coccydynia    • Depressive disorder    • H/O cold sores    • Intestinal disorder     unknown cause, IBS like symptoms, celiac vs gluten intolerance undetermined   • Stress incontinence    • Thyroid disease       Past Surgical History:   Procedure Laterality Date   • Colonoscopy     • [place] tooth extraction       ALLERGIES:   Allergen Reactions   • Gluten Meal   (Food Or Med) Other (See Comments)     Per pt its a digestive concern.     Current Outpatient Medications   Medication Sig Dispense Refill   • venlafaxine XR (EFFEXOR XR) 150 MG 24  hr capsule Take 1 capsule by mouth daily. 90 capsule 0   • venlafaxine XR (EFFEXOR XR) 37.5 MG 24 hr capsule TAKE 1 CAPSULE BY MOUTH ALONG WITH THE 75 MG CAPSULE DAILY AT BEDTIME FOR A DAILY DOSE OF 112MG 90 capsule 3   • venlafaxine XR (EFFEXOR XR) 37.5 MG 24 hr capsule Take 1 cap along with the 75mg cap daily at bedtime (daily dose 112mg) 90 capsule 0   • venlafaxine XR (EFFEXOR XR) 75 MG 24 hr capsule Take 1 cap along with the 37.5mg cap daily at bedtime (daily dose 112mg) 90 capsule 0   • loratadine (CLARITIN) 10 MG tablet Take 10 mg by mouth nightly.      • IBUPROFEN PO      • acetaminophen (TYLENOL) 325 MG tablet Take 650 mg by mouth every 4 hours as needed for Pain.     • Valacyclovir HCl 1000 MG Tab TAKE 1 TABLET BY MOUTH DAILY. MAY TAKE 1 TABLET TWICE DAILY FOR 7 DAYS DURING AN OUTBREAK. 111 tablet 0   • levothyroxine (SYNTHROID, LEVOTHROID) 75 MCG tablet Take 1 tablet by mouth daily. 90 tablet 3   • VITAMIN D PO Take 2,000 Units by mouth daily.       No current facility-administered medications for this visit.     No family history on file.  Social History     Socioeconomic History   • Marital status: /Civil Union     Spouse name: Not on file   • Number of children: Not on file   • Years of education: Not on file   • Highest education level: Not on file   Occupational History   • Not on file   Tobacco Use   • Smoking status: Never Smoker   • Smokeless tobacco: Never Used   Substance and Sexual Activity   • Alcohol use: Yes     Alcohol/week: 3.0 standard drinks     Types: 3 Standard drinks or equivalent per week   • Drug use: Never   • Sexual activity: Yes     Partners: Male   Other Topics Concern   • Not on file   Social History Narrative   • Not on file     Social Determinants of Health     Financial Resource Strain:    • Social Determinants: Financial Resource Strain:    Food Insecurity:    • Social Determinants: Food Insecurity:    Transportation Needs:    • Lack of Transportation (Medical):    •  Lack of Transportation (Non-Medical):    Physical Activity:    • Days of Exercise per Week:    • Minutes of Exercise per Session:    Stress:    • Social Determinants: Stress:    Social Connections:    • Social Determinants: Social Connections:    Intimate Partner Violence:    • Social Determinants: Intimate Partner Violence Past Fear:    • Social Determinants: Intimate Partner Violence Current Fear:      Immunization History   Administered Date(s) Administered   • COVID-19 Pfizer-[employer] 03/25/2021, 04/16/2021   • Influenza, injectable, quadrivalent, preservative-free 09/29/2014, 10/05/2015, 09/29/2016, 09/19/2017, 09/21/2018, 09/25/2019, 10/06/2020   • Tdap 06/14/2014, 08/08/2017       Visit Vitals  LMP 03/23/2021 (Exact Date)      Physical Exam  Vitals reviewed.   Constitutional:       Appearance: Normal appearance.   HENT:      Head: Normocephalic and atraumatic.      Right Ear: External ear normal.      Left Ear: External ear normal.      Nose: Nose normal.      Mouth/Throat:      Mouth: Mucous membranes are moist.   Eyes:      Extraocular Movements: Extraocular movements intact.      Conjunctiva/sclera: Conjunctivae normal.   Pulmonary:      Effort: Pulmonary effort is normal. No respiratory distress.   Musculoskeletal:         General: Normal range of motion.      Cervical back: Normal range of motion.      Right lower leg: No edema.      Left lower leg: No edema.   Skin:     General: Skin is warm and dry.   Neurological:      General: No focal deficit present.      Mental Status: She is alert and oriented to person, place, and time. Mental status is at baseline.   Psychiatric:         Behavior: Behavior normal.      Comments: Anxious mood and affect            Assessment & Plan:    Generalized anxiety disorder2  Episode of recurrent major depressive disorder, unspecified depression episode severity (CMS/HCC)  Not controlled  Increase to 150mg/day venlafaxine  Continue therapy  Healthy diet, exercise, stress  relief  Given reassurance, counseled at length  - venlafaxine XR (EFFEXOR XR) 150 MG 24 hr capsule; Take 1 capsule by mouth daily.  Dispense: 90 capsule; Refill: 0      Time spent in medical discussion with the patient: 20 min   This visit was completed via interactive synchronous virtual video visit.  Physician location: [address] [place] [address]  Patient location: home    Return in about 1 month (around 8/2/2021), or if symptoms worsen or fail to improve.    Krysten Lennon MD   162

## 2022-12-15 NOTE — H&P ADULT. - SUPRACLAVICULAR R
Problem: Chronic Conditions and Co-morbidities  Goal: Patient's chronic conditions and co-morbidity symptoms are monitored and maintained or improved  12/15/2022 0657 by Fred Jeter RN  Outcome: Progressing  12/15/2022 0657 by Fred Jeter RN  Outcome: Progressing     Problem: Discharge Planning  Goal: Discharge to home or other facility with appropriate resources  12/15/2022 0657 by Fred Jeter RN  Outcome: Progressing  12/15/2022 0657 by Fred Jeter RN  Outcome: Progressing     Problem: Pain  Goal: Verbalizes/displays adequate comfort level or baseline comfort level  12/15/2022 0657 by Fred Jeter RN  Outcome: Progressing  12/15/2022 0657 by Fred Jeter RN  Outcome: Progressing     Problem: Respiratory - Adult  Goal: Achieves optimal ventilation and oxygenation  12/15/2022 0657 by Fred Jeter RN  Outcome: Progressing  12/15/2022 0657 by Fred Jeter RN  Outcome: Progressing     Problem: Skin/Tissue Integrity  Goal: Absence of new skin breakdown  Description: 1. Monitor for areas of redness and/or skin breakdown  2. Assess vascular access sites hourly  3. Every 4-6 hours minimum:  Change oxygen saturation probe site  4. Every 4-6 hours:  If on nasal continuous positive airway pressure, respiratory therapy assess nares and determine need for appliance change or resting period.   12/15/2022 0657 by Fred Jeter RN  Outcome: Progressing  12/15/2022 0657 by Fred Jeter RN  Outcome: Progressing     Problem: ABCDS Injury Assessment  Goal: Absence of physical injury  12/15/2022 0657 by Fred Jeter RN  Outcome: Progressing  12/15/2022 0657 by Fred Jeter RN  Outcome: Progressing     Problem: Safety - Adult  Goal: Free from fall injury  12/15/2022 0657 by Fred Jeter RN  Outcome: Progressing  12/15/2022 0657 by Fred Jeter RN  Outcome: Progressing normal

## 2023-07-19 NOTE — ED PROVIDER NOTE - TOBACCO USE
Patient arrived to unit at approx. 1730. Patient alert oriented and stable. Remainder of the admission assessment completed by this RN.    Patient to remain NPO until evaluated by speech/nutrition as he has J-tube and recent aspiration pneumonia.     Medications not yet reconciled by pharmacy   Unknown if ever smoked

## 2023-10-01 PROBLEM — Z92.3 HISTORY OF RADIATION THERAPY: Status: ACTIVE | Noted: 2018-10-21

## 2023-10-26 NOTE — PHYSICAL THERAPY INITIAL EVALUATION ADULT - LEVEL OF INDEPENDENCE: STAIR NEGOTIATION, REHAB EVAL
independent Cartilage Graft Text: The defect edges were debeveled with a #15 scalpel blade.  Given the location of the defect, shape of the defect, the fact the defect involved a full thickness cartilage defect a cartilage graft was deemed most appropriate.  An appropriate donor site was identified, cleansed, and anesthetized. The cartilage graft was then harvested and transferred to the recipient site, oriented appropriately and then sutured into place.  The secondary defect was then repaired using a primary closure.

## 2023-11-28 NOTE — H&P ADULT. - DOES THIS PATIENT HAVE A HISTORY OF OR HAS BEEN DX WITH HEART FAILURE?
sacral decub  PEG tube  sepsis    CT noted  ID following  wound care f/u  cultures noted  cont tube feeds as tolerated   aspiration precautions   will follow   no

## 2025-06-02 NOTE — ED ADULT NURSE NOTE - NS ED NOTE  TALK SOMEONE YN
Patient takes buproprion 150 XL qd, unsure why no Patient takes buproprion 150 XL qd, unsure why  - Rec follow up with pharmacy in morning